# Patient Record
Sex: FEMALE | Race: WHITE | NOT HISPANIC OR LATINO | URBAN - METROPOLITAN AREA
[De-identification: names, ages, dates, MRNs, and addresses within clinical notes are randomized per-mention and may not be internally consistent; named-entity substitution may affect disease eponyms.]

---

## 2017-06-09 ENCOUNTER — DOCUMENTATION ONLY (OUTPATIENT)
Dept: HEMATOLOGY/ONCOLOGY | Facility: CLINIC | Age: 72
End: 2017-06-09

## 2017-06-09 ENCOUNTER — TELEPHONE (OUTPATIENT)
Dept: HEMATOLOGY/ONCOLOGY | Facility: CLINIC | Age: 72
End: 2017-06-09

## 2019-12-03 ENCOUNTER — HOSPITAL ENCOUNTER (OUTPATIENT)
Dept: INFUSION THERAPY | Age: 74
Discharge: HOME | End: 2019-12-03
Payer: MEDICARE

## 2019-12-03 ENCOUNTER — OFFICE VISIT (OUTPATIENT)
Dept: ONCOLOGY | Age: 74
End: 2019-12-03

## 2019-12-03 VITALS
RESPIRATION RATE: 22 BRPM | WEIGHT: 174 LBS | SYSTOLIC BLOOD PRESSURE: 127 MMHG | HEART RATE: 74 BPM | TEMPERATURE: 97.9 F | OXYGEN SATURATION: 98 % | DIASTOLIC BLOOD PRESSURE: 56 MMHG

## 2019-12-03 VITALS
DIASTOLIC BLOOD PRESSURE: 56 MMHG | TEMPERATURE: 97.9 F | RESPIRATION RATE: 22 BRPM | OXYGEN SATURATION: 98 % | SYSTOLIC BLOOD PRESSURE: 127 MMHG | HEART RATE: 74 BPM

## 2019-12-03 DIAGNOSIS — N18.4 STAGE 4 CHRONIC KIDNEY DISEASE (HCC): ICD-10-CM

## 2019-12-03 DIAGNOSIS — D64.9 NORMOCYTIC ANEMIA: Primary | ICD-10-CM

## 2019-12-03 DIAGNOSIS — D64.9 NORMOCYTIC ANEMIA: ICD-10-CM

## 2019-12-03 LAB
ALBUMIN SERPL-MCNC: 3.1 G/DL (ref 3.4–5)
ALBUMIN/GLOB SERPL: 0.9 {RATIO} (ref 0.8–1.7)
ALP SERPL-CCNC: 77 U/L (ref 45–117)
ALT SERPL-CCNC: 19 U/L (ref 13–56)
ANION GAP SERPL CALC-SCNC: 5 MMOL/L (ref 3–18)
AST SERPL-CCNC: 8 U/L (ref 10–38)
BASO+EOS+MONOS # BLD AUTO: 0.7 K/UL (ref 0–2.3)
BASO+EOS+MONOS NFR BLD AUTO: 17 % (ref 0.1–17)
BILIRUB SERPL-MCNC: 0.2 MG/DL (ref 0.2–1)
BUN SERPL-MCNC: 47 MG/DL (ref 7–18)
BUN/CREAT SERPL: 13 (ref 12–20)
CALCIUM SERPL-MCNC: 8.3 MG/DL (ref 8.5–10.1)
CHLORIDE SERPL-SCNC: 117 MMOL/L (ref 100–111)
CO2 SERPL-SCNC: 20 MMOL/L (ref 21–32)
CREAT SERPL-MCNC: 3.55 MG/DL (ref 0.6–1.3)
DIFFERENTIAL METHOD BLD: ABNORMAL
ERYTHROCYTE [DISTWIDTH] IN BLOOD BY AUTOMATED COUNT: 15.3 % (ref 11.5–14.5)
FERRITIN SERPL-MCNC: 26 NG/ML (ref 8–388)
FOLATE SERPL-MCNC: 19.3 NG/ML (ref 3.1–17.5)
GLOBULIN SER CALC-MCNC: 3.5 G/DL (ref 2–4)
GLUCOSE SERPL-MCNC: 118 MG/DL (ref 74–99)
HCT VFR BLD AUTO: 24.7 % (ref 36–48)
HGB BLD-MCNC: 8.2 G/DL (ref 12–16)
IRON SATN MFR SERPL: 17 %
IRON SERPL-MCNC: 47 UG/DL (ref 50–175)
LYMPHOCYTES # BLD: 1.4 K/UL (ref 1.1–5.9)
LYMPHOCYTES NFR BLD: 31 % (ref 14–44)
MCH RBC QN AUTO: 28 PG (ref 25–35)
MCHC RBC AUTO-ENTMCNC: 33.2 G/DL (ref 31–37)
MCV RBC AUTO: 84.3 FL (ref 78–102)
NEUTS SEG # BLD: 2.3 K/UL (ref 1.8–9.5)
NEUTS SEG NFR BLD: 53 % (ref 40–70)
PLATELET # BLD AUTO: 208 K/UL (ref 140–440)
POTASSIUM SERPL-SCNC: 4.9 MMOL/L (ref 3.5–5.5)
PROT SERPL-MCNC: 6.6 G/DL (ref 6.4–8.2)
RBC # BLD AUTO: 2.93 M/UL (ref 4.1–5.1)
RETICS/RBC NFR AUTO: 1.7 % (ref 0.5–2.3)
SODIUM SERPL-SCNC: 142 MMOL/L (ref 136–145)
TIBC SERPL-MCNC: 278 UG/DL (ref 250–450)
VIT B12 SERPL-MCNC: 891 PG/ML (ref 211–911)
WBC # BLD AUTO: 4.4 K/UL (ref 4.5–13)

## 2019-12-03 PROCEDURE — 82607 VITAMIN B-12: CPT

## 2019-12-03 PROCEDURE — 82784 ASSAY IGA/IGD/IGG/IGM EACH: CPT

## 2019-12-03 PROCEDURE — 82728 ASSAY OF FERRITIN: CPT

## 2019-12-03 PROCEDURE — 82668 ASSAY OF ERYTHROPOIETIN: CPT

## 2019-12-03 PROCEDURE — 83540 ASSAY OF IRON: CPT

## 2019-12-03 PROCEDURE — 80053 COMPREHEN METABOLIC PANEL: CPT

## 2019-12-03 PROCEDURE — 85025 COMPLETE CBC W/AUTO DIFF WBC: CPT

## 2019-12-03 PROCEDURE — 36415 COLL VENOUS BLD VENIPUNCTURE: CPT

## 2019-12-03 PROCEDURE — 85045 AUTOMATED RETICULOCYTE COUNT: CPT

## 2019-12-03 RX ORDER — PANTOPRAZOLE SODIUM 40 MG/1
40 TABLET, DELAYED RELEASE ORAL DAILY
COMMUNITY

## 2019-12-03 RX ORDER — LANOLIN ALCOHOL/MO/W.PET/CERES
CREAM (GRAM) TOPICAL 2 TIMES DAILY
COMMUNITY
End: 2021-01-01

## 2019-12-03 RX ORDER — PREGABALIN 150 MG/1
150 CAPSULE ORAL 2 TIMES DAILY
COMMUNITY

## 2019-12-03 RX ORDER — HYDRALAZINE HYDROCHLORIDE 10 MG/1
30 TABLET, FILM COATED ORAL 3 TIMES DAILY
COMMUNITY

## 2019-12-03 RX ORDER — FOLIC ACID 1 MG/1
TABLET ORAL DAILY
COMMUNITY

## 2019-12-03 RX ORDER — IRON 18 MG
TABLET ORAL DAILY
COMMUNITY
End: 2019-12-18

## 2019-12-03 RX ORDER — LANOLIN ALCOHOL/MO/W.PET/CERES
1000 CREAM (GRAM) TOPICAL DAILY
COMMUNITY

## 2019-12-03 RX ORDER — SODIUM BICARBONATE 650 MG/1
650 TABLET ORAL 2 TIMES DAILY
COMMUNITY

## 2019-12-03 RX ORDER — BUDESONIDE AND FORMOTEROL FUMARATE DIHYDRATE 160; 4.5 UG/1; UG/1
2 AEROSOL RESPIRATORY (INHALATION) 2 TIMES DAILY
COMMUNITY

## 2019-12-03 NOTE — PROGRESS NOTES
Cuauhtemoc Oconnell is a 76 y.o. female presenting today for a new patient appointment. Patient is ambulatory with a cane, is accompanied by her sister, and reports dyspnea. Chief Complaint Patient presents with  Anemia Visit Vitals /56 Pulse 74 Temp 97.9 °F (36.6 °C) (Oral) Resp 22 Wt 174 lb (78.9 kg) SpO2 98% Current Outpatient Medications Medication Sig  pantoprazole (PROTONIX) 40 mg tablet Take 40 mg by mouth daily.  cyanocobalamin (VITAMIN B-12) 1,000 mcg tablet Take 1,000 mcg by mouth daily.  folic acid (FOLVITE) 1 mg tablet Take  by mouth daily.  iron 18 mg tab Take  by mouth daily.  sodium bicarbonate 650 mg tablet Take 650 mg by mouth two (2) times a day.  pregabalin (LYRICA) 150 mg capsule Take 150 mg by mouth two (2) times a day.  ferrous sulfate 325 mg (65 mg iron) tablet Take  by mouth two (2) times a day.  budesonide-formoterol (SYMBICORT) 160-4.5 mcg/actuation HFAA Take 2 Puffs by inhalation two (2) times a day.  hydrALAZINE (APRESOLINE) 10 mg tablet Take 30 mg by mouth three (3) times daily.  pitavastatin calcium (LIVALO) 4 mg tab tablet Take  by mouth daily. No current facility-administered medications for this visit. Medications no longer taking/discontinued: none Fall Risk Assessment, last 12 mths 12/3/2019 Able to walk? Yes Fall in past 12 months? No  
 
 
3 most recent PHQ Screens 12/3/2019 Little interest or pleasure in doing things Not at all Feeling down, depressed, irritable, or hopeless Not at all Total Score PHQ 2 0 Abuse Screening Questionnaire 12/3/2019 Do you ever feel afraid of your partner? [place] Are you in a relationship with someone who physically or mentally threatens you? [place] Is it safe for you to go home? Aurea Philip Learning Assessment 12/3/2019 PRIMARY LEARNER Patient PRIMARY LANGUAGE ENGLISH  
LEARNER PREFERENCE PRIMARY LISTENING  
ANSWERED BY patient RELATIONSHIP SELF

## 2019-12-03 NOTE — PROGRESS NOTES
[address] [facility] [address] [facility], [address] [facility] Office Phone: [phone] Fax: [phone] NEW HEME/ONC CONSULT Reason for visit:  Anemia HPI:   Cuauhtemoc Oconnell is a 76 y.o.  female with past medical history including hypertension, CKD 4, type 2 diabetes, hyperlipidemia and COPD, who I was asked to see in consultation at the request of Rica Alanis and Madeleine Frias MD, PA-C for evaluation for anemia. As per note, labs on 10/15/2019 showed H&H 6.7/21.4, platelet 152, transferrin saturation 43%, ferritin 105. MCV 89. BUN 50, creatinine 3.19.  TSH normal, vitamin B12 normal at 972. [place] Past Medical History:  
Diagnosis Date  Anemia  Chronic kidney disease (CKD)  COPD (chronic obstructive pulmonary disease) (HCC)  Diabetes ([address]  GERD (gastroesophageal reflux disease)  HTN (hypertension)  Hyperlipidemia Past Surgical History:  
Procedure Laterality Date 201 [facility] Social History Socioeconomic History  Marital status: UNKNOWN Spouse name: Not on file  Number of children: Not on file  Years of education: Not on file  Highest education level: Not on file Tobacco Use  Smoking status: Former Smoker  Smokeless tobacco: Never Used Substance and Sexual Activity  Alcohol use: Not Currently  Drug use: Never  Sexual activity: Not Currently History reviewed. No pertinent family history. No Known Allergies Review of Systems Denies any fevers, chills, shortness of breath, nausea vomiting abdominal pain. No weight loss. No focal neurologic deficit. Only positive in ROS is dyspnea on exertion occasionally. No melena or BRBPR. All other point of review of system have been reviewed and were negative. ECOG performance status 1. Independent with ADLs and IADLs. Objective: 
Physical Exam: 
Visit Vitals /56 Pulse 74 Temp 97.9 °F (36.6 °C) (Oral) Resp 22 Wt 78.9 kg (174 lb) SpO2 98% General:  Alert, cooperative, no distress, appears stated age. Head:  Normocephalic, without obvious abnormality, atraumatic. Eyes:  Conjunctivae/corneas clear. PERRL, EOMs intact. Throat: Lips, mucosa, and tongue normal.   
Neck: Supple, symmetrical, trachea midline, no adenopathy, thyroid: no enlargement/tenderness/nodules Back:   Symmetric, no curvature. ROM normal. No CVA tenderness. Lungs:   Clear to auscultation bilaterally. Chest wall:  No tenderness or deformity. Heart:  Regular rate and rhythm, S1, S2 normal, no murmur, click, rub or gallop. Abdomen:   Soft, non-tender. Bowel sounds normal. No masses,  No organomegaly. Extremities: Extremities normal, atraumatic, no cyanosis or edema. Skin: Skin color, texture, turgor normal. No rashes or lesions. Lymph nodes: Cervical, supraclavicular, and axillary nodes normal.  
Neurologic: CNII-XII intact. Diagnostic Imaging No results found for this or any previous visit. Lab Results No results found for: WBC, HGB, HGBP, HCT, PHCT, RBCH, PLT, MCV, HGBEXT, HCTEXT, PLTEXT, HGBEXT, HCTEXT, PLTEXT No results found for: NA, K, CL, CO2, AGAP, GLU, BUN, CREA, BUCR, GFRAA, GFRNA, CA, TBIL, GPT, SGOT, AP, TP, ALB, GLOB, AGRAT, ALT Assessment/Plan: 
76 y.o. female 1. Normocytic anemia Patient with normocytic anemia likely from anemia of chronic kidney disease. She had colonoscopy in [place] she said. She was told to have repeated in 5 years she said. I will however r/o myeloma as well as nutritional deficiency as below. - CBC WITH AUTOMATED DIFF; Future - FERRITIN; Future 
- IRON PROFILE; Future - METABOLIC PANEL, COMPREHENSIVE; Future - RETICULOCYTE COUNT; Future - VITAMIN B12 & FOLATE; Future - GAMMOPATHY EVAL, SPEP/KELLI, IG QT/FLC; Future 2. Stage 4 chronic kidney disease ([facility] [address] Check serum Epo level.  If<500, then she would qualify for erythropoietin agent (BOONE) replacement if iron store adequate. Follow-up with nephrologist. 
- CBC WITH AUTOMATED DIFF; Future - FERRITIN; Future 
- IRON PROFILE; Future - METABOLIC PANEL, COMPREHENSIVE; Future - RETICULOCYTE COUNT; Future - VITAMIN B12 & FOLATE; Future - GAMMOPATHY EVAL, SPEP/KELLI, IG QT/FLC; Future - Epo level Return in 2 weeks

## 2019-12-03 NOTE — PROGRESS NOTES
[facility] Progress Note Date: December 3, 2019 Name: Carlos Travis MRN: [id number] : 1945 Peripheral Lab Draw Ms. Espinal to [place], ambulatory at 1400 accompanied by self. Pt was assessed and education was provided. Ms. Gayathri Zafar vitals were reviewed and patient was observed for 5 minutes prior to treatment. Visit Vitals /56 Pulse 74 Temp 97.9 °F (36.6 °C) (Oral) Resp 22 SpO2 98% Recent Results (from the past 12 hour(s)) CBC WITH 3 PART DIFF Collection Time: 19  2:10 PM  
Result Value Ref Range WBC 4.4 (L) 4.5 - 13.0 K/uL  
 RBC 2.93 (L) 4.10 - 5.10 M/uL HGB 8.2 (L) 12.0 - 16.0 g/dL HCT 24.7 (L) 36 - 48 % MCV 84.3 78 - 102 FL  
 MCH 28.0 25.0 - 35.0 PG  
 MCHC 33.2 31 - 37 g/dL  
 RDW 15.3 (H) 11.5 - 14.5 % PLATELET 169 778 - 161 K/uL NEUTROPHILS 53 40 - 70 % MIXED CELLS 17 0.1 - 17 % LYMPHOCYTES 31 14 - 44 % ABS. NEUTROPHILS 2.3 1.8 - 9.5 K/UL  
 ABS. MIXED CELLS 0.7 0.0 - 2.3 K/uL  
 ABS. LYMPHOCYTES 1.4 1.1 - 5.9 K/UL  
 DF AUTOMATED Blood obtained peripherally from left arm x 1 attempt with butterfly needle and sent to lab for Cbc w/diff, Cmp, Ferritin, Iron Profile, Retic count, Vitamin B12 & Folate, Erthropoietin and Gammopathy Eval per written orders. No bleeding or hematoma noted at site. Gauze and coban applied. Ms. Trupti Barragan tolerated the phlebotomy, and had no complaints. Patient armband removed and shredded. Ms. Trupti Barragan was discharged from [place] [address] in stable condition at 1410. Shad Almodovar Phlebotomist PCT December 3, 2019 
2:20 PM

## 2019-12-05 LAB
ALBUMIN SERPL ELPH-MCNC: 3.4 G/DL (ref 2.9–4.4)
ALBUMIN/GLOB SERPL: 1.4 {RATIO} (ref 0.7–1.7)
ALPHA1 GLOB SERPL ELPH-MCNC: 0.2 G/DL (ref 0–0.4)
ALPHA2 GLOB SERPL ELPH-MCNC: 0.6 G/DL (ref 0.4–1)
B-GLOBULIN SERPL ELPH-MCNC: 1 G/DL (ref 0.7–1.3)
EPO SERPL-ACNC: 14.6 MIU/ML (ref 2.6–18.5)
GAMMA GLOB SERPL ELPH-MCNC: 0.7 G/DL (ref 0.4–1.8)
GLOBULIN SER-MCNC: 2.5 G/DL (ref 2.2–3.9)
IGA SERPL-MCNC: 227 MG/DL (ref 64–422)
IGG SERPL-MCNC: 854 MG/DL (ref 700–1600)
IGM SERPL-MCNC: 53 MG/DL (ref 26–217)
INTERPRETATION SERPL IEP-IMP: ABNORMAL
KAPPA LC FREE SER-MCNC: 95 MG/L (ref 3.3–19.4)
KAPPA LC FREE/LAMBDA FREE SER: 1.73 {RATIO} (ref 0.26–1.65)
LAMBDA LC FREE SERPL-MCNC: 54.8 MG/L (ref 5.7–26.3)
M PROTEIN SERPL ELPH-MCNC: ABNORMAL G/DL
PROT SERPL-MCNC: 5.9 G/DL (ref 6–8.5)

## 2019-12-18 ENCOUNTER — OFFICE VISIT (OUTPATIENT)
Dept: ONCOLOGY | Age: 74
End: 2019-12-18

## 2019-12-18 VITALS
DIASTOLIC BLOOD PRESSURE: 62 MMHG | SYSTOLIC BLOOD PRESSURE: 136 MMHG | OXYGEN SATURATION: 98 % | BODY MASS INDEX: 29.76 KG/M2 | RESPIRATION RATE: 18 BRPM | TEMPERATURE: 99.4 F | HEART RATE: 83 BPM | WEIGHT: 173.4 LBS

## 2019-12-18 DIAGNOSIS — D64.9 NORMOCYTIC ANEMIA: Primary | ICD-10-CM

## 2019-12-18 DIAGNOSIS — N18.4 STAGE 4 CHRONIC KIDNEY DISEASE (HCC): ICD-10-CM

## 2019-12-18 RX ORDER — LINAGLIPTIN 5 MG/1
TABLET, FILM COATED ORAL
Refills: 0 | COMMUNITY
Start: 2019-12-09

## 2019-12-18 NOTE — PROGRESS NOTES
Betty Powell is a 76 y.o. female presenting today for a follow-up appointment. Patient is ambulatory with no assistive devices and denies any complaints. Chief Complaint   Patient presents with    Anemia       Visit Vitals  /62   Pulse 83   Temp 99.4 °F (37.4 °C) (Oral)   Resp 18   Wt 173 lb 6.4 oz (78.7 kg)   SpO2 98%   BMI 29.76 kg/m²       Current Outpatient Medications   Medication Sig    TRADJENTA 5 mg tablet     pantoprazole (PROTONIX) 40 mg tablet Take 40 mg by mouth daily.  cyanocobalamin (VITAMIN B-12) 1,000 mcg tablet Take 1,000 mcg by mouth daily.  folic acid (FOLVITE) 1 mg tablet Take  by mouth daily.  sodium bicarbonate 650 mg tablet Take 650 mg by mouth two (2) times a day.  ferrous sulfate 325 mg (65 mg iron) tablet Take  by mouth two (2) times a day.  hydrALAZINE (APRESOLINE) 10 mg tablet Take 30 mg by mouth three (3) times daily.  pitavastatin calcium (LIVALO) 4 mg tab tablet Take  by mouth daily.  pregabalin (LYRICA) 150 mg capsule Take 150 mg by mouth two (2) times a day.  budesonide-formoterol (SYMBICORT) 160-4.5 mcg/actuation HFAA Take 2 Puffs by inhalation two (2) times a day. No current facility-administered medications for this visit. Medications no longer taking/discontinued: lyrica, symbicort    Fall Risk Assessment, last 12 mths 12/3/2019   Able to walk? Yes   Fall in past 12 months? No       3 most recent PHQ Screens 12/3/2019   Little interest or pleasure in doing things Not at all   Feeling down, depressed, irritable, or hopeless Not at all   Total Score PHQ 2 0       Abuse Screening Questionnaire 12/3/2019   Do you ever feel afraid of your partner? N   Are you in a relationship with someone who physically or mentally threatens you? N   Is it safe for you to go home? Y       1. Have you been to the ER, urgent care clinic since your last visit? Hospitalized since your last visit? No    2.  Have you seen or consulted any other health care providers outside of the [address] [place] [address] since your last visit? Include any pap smears or colon screening.  No

## 2019-12-18 NOTE — LETTER
12/18/19 Patient: José Miguel Tim YOB: 1945 Date of Visit: 12/18/2019 Abdoul Figueroa MD 
[address] [address] [address] VIA Facsimile: [phone] Dear Abdoul Figueroa MD, Thank you for referring Ms. José Miguel Tim to [facility] for evaluation. My notes for this consultation are attached. If you have questions, please do not hesitate to call me. I look forward to following your patient along with you.  
 
 
Sincerely, 
 
Betty Oneil MD

## 2019-12-18 NOTE — PROGRESS NOTES
[facility]  [address] [place] [address], [address]  [facility]  Office Phone: [phone]  Fax: [phone]      Reason for visit:  Anemia    HPI:   Dieudonne Brantley is a 76 y.o.  female with past medical history including hypertension, CKD 4, type 2 diabetes, hyperlipidemia and COPD, who I was asked to see in consultation at the request of Shadi Rueda and Lila Alcaraz MD, PA-C for evaluation for anemia. I saw her on 2019 and labs were ordered including myeloma work-up. She is here today for follow-up. She is clinically doing very well. No change in the interim. DX:Anemia      Past Medical History:   Diagnosis Date    Anemia     Chronic kidney disease (CKD)     COPD (chronic obstructive pulmonary disease) (HCC)     Diabetes (HCC)     GERD (gastroesophageal reflux disease)     HTN (hypertension)     Hyperlipidemia      Past Surgical History:   Procedure Laterality Date    HX HYSTERECTOMY       Social History     Socioeconomic History    Marital status: UNKNOWN     Spouse name: Not on file    Number of children: Not on file    Years of education: Not on file    Highest education level: Not on file   Tobacco Use    Smoking status: Former Smoker     Packs/day: 1.00     Years: 50.00     Pack years: 50.00     Types: Cigarettes     Last attempt to quit: 2018     Years since quittin.9    Smokeless tobacco: Never Used   Substance and Sexual Activity    Alcohol use: Not Currently    Drug use: Never    Sexual activity: Not Currently       No Known Allergies    Review of Systems  Denies any fevers, chills, shortness of breath, nausea vomiting abdominal pain. No weight loss. No focal neurologic deficit. Only positive in ROS is dyspnea on exertion occasionally. No melena or BRBPR. All other point of review of system have been reviewed and were negative. ECOG performance status 1. Independent with ADLs and IADLs.     Objective:  Physical Exam:  Visit Vitals  /62   Pulse 83   Temp 99.4 °F (37.4 °C) (Oral)   Resp 18   Wt 78.7 kg (173 lb 6.4 oz)   SpO2 98%   BMI 29.76 kg/m²         General:  Alert, cooperative, no distress, appears stated age. Head:  Normocephalic, without obvious abnormality, atraumatic. Eyes:  Conjunctivae/corneas clear. PERRL, EOMs intact. Throat: Lips, mucosa, and tongue normal.    Neck: Supple, symmetrical, trachea midline, no adenopathy, thyroid: no enlargement/tenderness/nodules   Back:   Symmetric, no curvature. ROM normal. No CVA tenderness. Lungs:   Clear to auscultation bilaterally. Chest wall:  No tenderness or deformity. Heart:  Regular rate and rhythm, S1, S2 normal, +2/6 JARED   Abdomen:   Soft, non-tender. Bowel sounds normal. No masses,  No organomegaly. Extremities: Extremities normal, atraumatic, no cyanosis or edema. Skin: Skin color, texture, turgor normal. No rashes or lesions. Lymph nodes: Cervical, supraclavicular, and axillary nodes normal.   Neurologic: CNII-XII intact. Diagnostic Imaging     Results for orders placed during the hospital encounter of 12/11/19   CT LOW DOSE LUNG CANCER SCREENING    Narrative EXAM: CT LOW DOSE LUNG CANCER SCREENING    INDICATION: Smoking history. . Lung screening. TECHNIQUE: Low-dose CT scan of the chest without IV contrast including coronal  and sagittal images. DICOM format image data is available to non-affiliated external healthcare  facilities or entities on a secure, media free, reciprocally searchable basis  with patient authorization for 12 months following the date of the study. COMPARISON:   No relevant prior studies available. FINDINGS:  Pulmonary arteries: Unremarkable. .  Aorta: No acute findings. No thoracic aortic aneurysm . Lungs: The lungs are clear and well expanded. No infiltrates or nodules or  masses. Lymph nodes: Unremarkable. No enlarged lymph nodes. Esophagus: Normal  Pleural spaces: Unremarkable.  No significant effusion. No pneumothorax. Heart: Unremarkable. No cardiomegaly. No significant pericardial effusion. No  evidence of right ventricular dysfunction. Thyroid: Normal  Chest wall: No abnormal findings. Bones: No fractures identified. Upper abdomen: Normal      Impression IMPRESSION:  Lung rads category 1. No lung nodules. Continue annual screening with low-dose  CT in 12 months. Lab Results  Lab Results   Component Value Date/Time    WBC 4.4 (L) 12/03/2019 02:10 PM    HGB 8.2 (L) 12/03/2019 02:10 PM    HCT 24.7 (L) 12/03/2019 02:10 PM    PLATELET 181 64/48/9668 02:10 PM    MCV 84.3 12/03/2019 02:10 PM       Lab Results   Component Value Date/Time    Sodium 142 12/03/2019 02:11 PM    Potassium 4.9 12/03/2019 02:11 PM    Chloride 117 (H) 12/03/2019 02:11 PM    CO2 20 (L) 12/03/2019 02:11 PM    Anion gap 5 12/03/2019 02:11 PM    Glucose 118 (H) 12/03/2019 02:11 PM    BUN 47 (H) 12/03/2019 02:11 PM    Creatinine 3.55 (H) 12/03/2019 02:11 PM    BUN/Creatinine ratio 13 12/03/2019 02:11 PM    GFR est AA 15 (L) 12/03/2019 02:11 PM    GFR est non-AA 13 (L) 12/03/2019 02:11 PM    Calcium 8.3 (L) 12/03/2019 02:11 PM    AST (SGOT) 8 (L) 12/03/2019 02:11 PM    Alk. phosphatase 77 12/03/2019 02:11 PM    Protein, total 6.6 12/03/2019 02:11 PM    Protein, total 5.9 (L) 12/03/2019 02:11 PM    Albumin 3.1 (L) 12/03/2019 02:11 PM    Globulin 3.5 12/03/2019 02:11 PM    A-G Ratio 0.9 12/03/2019 02:11 PM    ALT (SGPT) 19 12/03/2019 02:11 PM     Follow-up with PCP for health maintenance    Assessment/Plan:  76 y.o. female  1. Normocytic anemia  Patient with normocytic anemia likely from anemia of chronic kidney disease. She had colonoscopy in [place] she said. She was told to have repeated in 5 years she said. I will however r/o myeloma as well as nutritional deficiency as below. Labs on 12/3/2019 showed a ferritin of 26, transferrin saturation 17%, BUN 47, creatinine 3.55, normal B12 and folate.   IgG, IgA and IgM were normal. K 95,L 55, K/L1.73. Total protein 5.9, albumin 3.4, SPEP showed no M spike and KELLI was normal.  Serum erythropoietin was 14.6. WBC 4.4, H&H 8.2/25, platelet 782. MCV 84. Plan is   #1 to replenish his iron store with IV Injectafer x2  #2 patient has serum erythropoietin less than 500. He will likely benefit from erythropoietin stimulating agent once his iron stores are replenished. I will defer to nephrology. #3 myeloma work-up is negative. The elevated light chain are due to her kidney disease. #4 PCP to refer patient to GI to rule out any GI bleeding. 2. Stage 4 chronic kidney disease (HCC)  Check serum Epo level. If<500, then she would qualify for erythropoietin agent (BOONE) replacement if iron store adequate. Follow-up with nephrologist for erythropoietin stimulating agent and management of her kidney disease.     Return in 6 months

## 2019-12-31 RX ORDER — HEPARIN 100 UNIT/ML
300-500 SYRINGE INTRAVENOUS AS NEEDED
Status: CANCELLED
Start: 2020-01-14

## 2019-12-31 RX ORDER — ALBUTEROL SULFATE 0.83 MG/ML
2.5 SOLUTION RESPIRATORY (INHALATION) AS NEEDED
Status: CANCELLED
Start: 2020-01-14

## 2019-12-31 RX ORDER — ACETAMINOPHEN 325 MG/1
650 TABLET ORAL AS NEEDED
Status: CANCELLED
Start: 2020-01-14

## 2019-12-31 RX ORDER — SODIUM CHLORIDE 0.9 % (FLUSH) 0.9 %
10 SYRINGE (ML) INJECTION AS NEEDED
Status: CANCELLED
Start: 2020-01-14

## 2019-12-31 RX ORDER — ONDANSETRON 2 MG/ML
8 INJECTION INTRAMUSCULAR; INTRAVENOUS AS NEEDED
Status: CANCELLED | OUTPATIENT
Start: 2020-01-14

## 2019-12-31 RX ORDER — EPINEPHRINE 1 MG/ML
0.3 INJECTION, SOLUTION, CONCENTRATE INTRAVENOUS AS NEEDED
Status: CANCELLED | OUTPATIENT
Start: 2020-01-14

## 2019-12-31 RX ORDER — DIPHENHYDRAMINE HYDROCHLORIDE 50 MG/ML
50 INJECTION, SOLUTION INTRAMUSCULAR; INTRAVENOUS AS NEEDED
Status: CANCELLED
Start: 2020-01-14

## 2019-12-31 RX ORDER — SODIUM CHLORIDE 9 MG/ML
25 INJECTION, SOLUTION INTRAVENOUS CONTINUOUS
Status: CANCELLED | OUTPATIENT
Start: 2020-01-14

## 2019-12-31 RX ORDER — HYDROCORTISONE SODIUM SUCCINATE 100 MG/2ML
100 INJECTION, POWDER, FOR SOLUTION INTRAMUSCULAR; INTRAVENOUS AS NEEDED
Status: CANCELLED | OUTPATIENT
Start: 2020-01-14

## 2019-12-31 RX ORDER — SODIUM CHLORIDE 9 MG/ML
10 INJECTION INTRAMUSCULAR; INTRAVENOUS; SUBCUTANEOUS AS NEEDED
Status: CANCELLED | OUTPATIENT
Start: 2020-01-14

## 2020-01-01 ENCOUNTER — HOSPITAL ENCOUNTER (OUTPATIENT)
Dept: INFUSION THERAPY | Age: 75
Discharge: HOME | End: 2020-12-28
Payer: MEDICARE

## 2020-01-01 VITALS
DIASTOLIC BLOOD PRESSURE: 59 MMHG | OXYGEN SATURATION: 98 % | HEART RATE: 77 BPM | SYSTOLIC BLOOD PRESSURE: 147 MMHG | TEMPERATURE: 98.4 F

## 2020-01-01 DIAGNOSIS — D64.9 NORMOCYTIC ANEMIA: ICD-10-CM

## 2020-01-01 DIAGNOSIS — N18.4 STAGE 4 CHRONIC KIDNEY DISEASE (HCC): Primary | ICD-10-CM

## 2020-01-01 LAB
ALBUMIN SERPL-MCNC: 3.1 G/DL (ref 3.4–5)
ALBUMIN/GLOB SERPL: 0.9 {RATIO} (ref 0.8–1.7)
ALP SERPL-CCNC: 79 U/L (ref 45–117)
ALT SERPL-CCNC: 12 U/L (ref 13–56)
ANION GAP SERPL CALC-SCNC: 7 MMOL/L (ref 3–18)
AST SERPL-CCNC: 5 U/L (ref 10–38)
BASOPHILS # BLD: 0.1 K/UL (ref 0–0.1)
BASOPHILS NFR BLD: 2 % (ref 0–2)
BILIRUB SERPL-MCNC: 0.3 MG/DL (ref 0.2–1)
BUN SERPL-MCNC: 43 MG/DL (ref 7–18)
BUN/CREAT SERPL: 7 (ref 12–20)
CALCIUM SERPL-MCNC: 8.1 MG/DL (ref 8.5–10.1)
CHLORIDE SERPL-SCNC: 105 MMOL/L (ref 100–111)
CO2 SERPL-SCNC: 28 MMOL/L (ref 21–32)
CREAT SERPL-MCNC: 6.23 MG/DL (ref 0.6–1.3)
DIFFERENTIAL METHOD BLD: ABNORMAL
EOSINOPHIL # BLD: 0.2 K/UL (ref 0–0.4)
EOSINOPHIL NFR BLD: 7 % (ref 0–5)
ERYTHROCYTE [DISTWIDTH] IN BLOOD BY AUTOMATED COUNT: 17 % (ref 11.6–14.5)
FERRITIN SERPL-MCNC: 36 NG/ML (ref 8–388)
GLOBULIN SER CALC-MCNC: 3.6 G/DL (ref 2–4)
GLUCOSE SERPL-MCNC: 223 MG/DL (ref 74–99)
HCT VFR BLD AUTO: 25 % (ref 35–45)
HGB BLD-MCNC: 7.4 G/DL (ref 12–16)
IRON SATN MFR SERPL: 61 % (ref 20–50)
IRON SERPL-MCNC: 177 UG/DL (ref 50–175)
LYMPHOCYTES # BLD: 0.7 K/UL (ref 0.9–3.6)
LYMPHOCYTES NFR BLD: 22 % (ref 21–52)
MCH RBC QN AUTO: 28 PG (ref 24–34)
MCHC RBC AUTO-ENTMCNC: 29.6 G/DL (ref 31–37)
MCV RBC AUTO: 94.7 FL (ref 74–97)
MONOCYTES # BLD: 0.4 K/UL (ref 0.05–1.2)
MONOCYTES NFR BLD: 11 % (ref 3–10)
NEUTS SEG # BLD: 1.9 K/UL (ref 1.8–8)
NEUTS SEG NFR BLD: 58 % (ref 40–73)
PLATELET # BLD AUTO: 201 K/UL (ref 135–420)
PMV BLD AUTO: 11.5 FL (ref 9.2–11.8)
POTASSIUM SERPL-SCNC: 4.2 MMOL/L (ref 3.5–5.5)
PROT SERPL-MCNC: 6.7 G/DL (ref 6.4–8.2)
RBC # BLD AUTO: 2.64 M/UL (ref 4.2–5.3)
SODIUM SERPL-SCNC: 140 MMOL/L (ref 136–145)
TIBC SERPL-MCNC: 290 UG/DL (ref 250–450)
WBC # BLD AUTO: 3.4 K/UL (ref 4.6–13.2)

## 2020-01-01 PROCEDURE — 80053 COMPREHEN METABOLIC PANEL: CPT

## 2020-01-01 PROCEDURE — 82728 ASSAY OF FERRITIN: CPT

## 2020-01-01 PROCEDURE — 83540 ASSAY OF IRON: CPT

## 2020-01-01 PROCEDURE — 85025 COMPLETE CBC W/AUTO DIFF WBC: CPT

## 2020-01-01 PROCEDURE — 36415 COLL VENOUS BLD VENIPUNCTURE: CPT

## 2020-01-07 ENCOUNTER — HOSPITAL ENCOUNTER (OUTPATIENT)
Dept: INFUSION THERAPY | Age: 75
End: 2020-01-07
Payer: MEDICARE

## 2020-01-07 DIAGNOSIS — D64.9 NORMOCYTIC ANEMIA: ICD-10-CM

## 2020-01-14 ENCOUNTER — HOSPITAL ENCOUNTER (OUTPATIENT)
Dept: INFUSION THERAPY | Age: 75
Discharge: HOME | End: 2020-01-14
Payer: MEDICARE

## 2020-01-14 VITALS
DIASTOLIC BLOOD PRESSURE: 73 MMHG | OXYGEN SATURATION: 98 % | HEART RATE: 78 BPM | TEMPERATURE: 98 F | RESPIRATION RATE: 20 BRPM | SYSTOLIC BLOOD PRESSURE: 160 MMHG

## 2020-01-14 DIAGNOSIS — D64.9 NORMOCYTIC ANEMIA: Primary | ICD-10-CM

## 2020-01-14 DIAGNOSIS — D64.9 NORMOCYTIC ANEMIA: ICD-10-CM

## 2020-01-14 PROCEDURE — 74011250636 HC RX REV CODE- 250/636: Performed by: INTERNAL MEDICINE

## 2020-01-14 PROCEDURE — 96365 THER/PROPH/DIAG IV INF INIT: CPT

## 2020-01-14 RX ORDER — SODIUM CHLORIDE 9 MG/ML
25 INJECTION, SOLUTION INTRAVENOUS CONTINUOUS
Status: CANCELLED | OUTPATIENT
Start: 2020-01-21

## 2020-01-14 RX ORDER — ONDANSETRON 2 MG/ML
8 INJECTION INTRAMUSCULAR; INTRAVENOUS AS NEEDED
Status: CANCELLED | OUTPATIENT
Start: 2020-01-21

## 2020-01-14 RX ORDER — HYDROCORTISONE SODIUM SUCCINATE 100 MG/2ML
100 INJECTION, POWDER, FOR SOLUTION INTRAMUSCULAR; INTRAVENOUS AS NEEDED
Status: CANCELLED | OUTPATIENT
Start: 2020-01-21

## 2020-01-14 RX ORDER — SODIUM CHLORIDE 9 MG/ML
10 INJECTION INTRAMUSCULAR; INTRAVENOUS; SUBCUTANEOUS AS NEEDED
Status: CANCELLED | OUTPATIENT
Start: 2020-01-21

## 2020-01-14 RX ORDER — DIPHENHYDRAMINE HYDROCHLORIDE 50 MG/ML
50 INJECTION, SOLUTION INTRAMUSCULAR; INTRAVENOUS AS NEEDED
Status: CANCELLED
Start: 2020-01-21

## 2020-01-14 RX ORDER — ALBUTEROL SULFATE 0.83 MG/ML
2.5 SOLUTION RESPIRATORY (INHALATION) AS NEEDED
Status: CANCELLED
Start: 2020-01-21

## 2020-01-14 RX ORDER — ACETAMINOPHEN 325 MG/1
650 TABLET ORAL AS NEEDED
Status: CANCELLED
Start: 2020-01-21

## 2020-01-14 RX ORDER — EPINEPHRINE 1 MG/ML
0.3 INJECTION, SOLUTION, CONCENTRATE INTRAVENOUS AS NEEDED
Status: CANCELLED | OUTPATIENT
Start: 2020-01-21

## 2020-01-14 RX ORDER — SODIUM CHLORIDE 0.9 % (FLUSH) 0.9 %
10 SYRINGE (ML) INJECTION AS NEEDED
Status: CANCELLED
Start: 2020-01-21

## 2020-01-14 RX ORDER — HEPARIN 100 UNIT/ML
300-500 SYRINGE INTRAVENOUS AS NEEDED
Status: CANCELLED
Start: 2020-01-21

## 2020-01-14 RX ORDER — SODIUM CHLORIDE 9 MG/ML
10 INJECTION INTRAMUSCULAR; INTRAVENOUS; SUBCUTANEOUS AS NEEDED
Status: ACTIVE | OUTPATIENT
Start: 2020-01-14 | End: 2020-01-14

## 2020-01-14 RX ORDER — SODIUM CHLORIDE 9 MG/ML
25 INJECTION, SOLUTION INTRAVENOUS CONTINUOUS
Status: DISPENSED | OUTPATIENT
Start: 2020-01-14 | End: 2020-01-14

## 2020-01-14 RX ADMIN — FERRIC CARBOXYMALTOSE INJECTION 750 MG: 50 INJECTION, SOLUTION INTRAVENOUS at 10:09

## 2020-01-14 RX ADMIN — SODIUM CHLORIDE 25 ML/HR: 9 INJECTION, SOLUTION INTRAVENOUS at 10:09

## 2020-01-21 ENCOUNTER — HOSPITAL ENCOUNTER (OUTPATIENT)
Dept: INFUSION THERAPY | Age: 75
Discharge: HOME | End: 2020-01-21
Payer: MEDICARE

## 2020-01-21 VITALS
DIASTOLIC BLOOD PRESSURE: 81 MMHG | HEART RATE: 93 BPM | OXYGEN SATURATION: 98 % | TEMPERATURE: 97.3 F | SYSTOLIC BLOOD PRESSURE: 190 MMHG | RESPIRATION RATE: 20 BRPM

## 2020-01-21 DIAGNOSIS — D64.9 NORMOCYTIC ANEMIA: Primary | ICD-10-CM

## 2020-01-21 PROCEDURE — 74011250636 HC RX REV CODE- 250/636: Performed by: INTERNAL MEDICINE

## 2020-01-21 PROCEDURE — 96365 THER/PROPH/DIAG IV INF INIT: CPT

## 2020-01-21 RX ORDER — ACETAMINOPHEN 325 MG/1
650 TABLET ORAL AS NEEDED
Status: CANCELLED
Start: 2020-01-21

## 2020-01-21 RX ORDER — ONDANSETRON 2 MG/ML
8 INJECTION INTRAMUSCULAR; INTRAVENOUS AS NEEDED
Status: CANCELLED | OUTPATIENT
Start: 2020-01-21

## 2020-01-21 RX ORDER — DIPHENHYDRAMINE HYDROCHLORIDE 50 MG/ML
50 INJECTION, SOLUTION INTRAMUSCULAR; INTRAVENOUS AS NEEDED
Status: CANCELLED
Start: 2020-01-21

## 2020-01-21 RX ORDER — SODIUM CHLORIDE 9 MG/ML
25 INJECTION, SOLUTION INTRAVENOUS CONTINUOUS
Status: DISCONTINUED | OUTPATIENT
Start: 2020-01-21 | End: 2020-01-22 | Stop reason: HOSPADM

## 2020-01-21 RX ORDER — SODIUM CHLORIDE 9 MG/ML
25 INJECTION, SOLUTION INTRAVENOUS CONTINUOUS
Status: CANCELLED | OUTPATIENT
Start: 2020-01-21

## 2020-01-21 RX ORDER — HYDROCORTISONE SODIUM SUCCINATE 100 MG/2ML
100 INJECTION, POWDER, FOR SOLUTION INTRAMUSCULAR; INTRAVENOUS AS NEEDED
Status: CANCELLED | OUTPATIENT
Start: 2020-01-21

## 2020-01-21 RX ORDER — SODIUM CHLORIDE 9 MG/ML
10 INJECTION INTRAMUSCULAR; INTRAVENOUS; SUBCUTANEOUS AS NEEDED
Status: DISCONTINUED | OUTPATIENT
Start: 2020-01-21 | End: 2020-01-22 | Stop reason: HOSPADM

## 2020-01-21 RX ORDER — SODIUM CHLORIDE 0.9 % (FLUSH) 0.9 %
10 SYRINGE (ML) INJECTION AS NEEDED
Status: CANCELLED
Start: 2020-01-21

## 2020-01-21 RX ORDER — HEPARIN 100 UNIT/ML
300-500 SYRINGE INTRAVENOUS AS NEEDED
Status: CANCELLED
Start: 2020-01-21

## 2020-01-21 RX ORDER — ALBUTEROL SULFATE 0.83 MG/ML
2.5 SOLUTION RESPIRATORY (INHALATION) AS NEEDED
Status: CANCELLED
Start: 2020-01-21

## 2020-01-21 RX ORDER — SODIUM CHLORIDE 9 MG/ML
10 INJECTION INTRAMUSCULAR; INTRAVENOUS; SUBCUTANEOUS AS NEEDED
Status: CANCELLED | OUTPATIENT
Start: 2020-01-21

## 2020-01-21 RX ORDER — EPINEPHRINE 1 MG/ML
0.3 INJECTION, SOLUTION, CONCENTRATE INTRAVENOUS AS NEEDED
Status: CANCELLED | OUTPATIENT
Start: 2020-01-21

## 2020-01-21 RX ADMIN — SODIUM CHLORIDE 25 ML/HR: 9 INJECTION, SOLUTION INTRAVENOUS at 14:33

## 2020-01-21 RX ADMIN — FERRIC CARBOXYMALTOSE INJECTION 750 MG: 50 INJECTION, SOLUTION INTRAVENOUS at 14:33

## 2020-01-21 RX ADMIN — SODIUM CHLORIDE 10 ML: 9 INJECTION INTRAMUSCULAR; INTRAVENOUS; SUBCUTANEOUS at 14:57

## 2020-01-21 NOTE — PROGRESS NOTES
[facility] Progress Note    Date: 2020    Name: Jennifer Saab    MRN: [id number]         : 1945    Injectafer Infusion 2 of 2    Ms. Espinal to [facility], at [phone]. Pt was assessed and education was provided. Ms. Leila Cameron vitals were reviewed and patient was observed for 5 minutes prior to treatment. Visit Vitals  /81 (BP 1 Location: Right arm, BP Patient Position: Sitting)   Pulse 93   Temp 97.3 °F (36.3 °C)   Resp 20   SpO2 98%         24g PIV placed in right antecubital x1 attempt. PIV flushed easily and had brisk blood return. Injectafer 750mg/250mL was initiated @ 750 ml/hr over 20 minutes. VS stable and pt denied complaints of itching, lip/tongue/facial swelling, SOB, CP or other complaints. Ms. Néstor Ruiz tolerated the infusion, and had no complaints. VS remained stable. PIV flushed with NS 10 ml and removed. No bleeding or hematoma noted at site. Guaze and coban applied. Patient armband removed and shredded. Ms. Néstor Ruiz was discharged from [place] [address] in stable condition at 1500. She is to return 2020 at 0800 for next appointment.     Vijay Alcaraz RN  2020  1500

## 2020-06-12 ENCOUNTER — APPOINTMENT (OUTPATIENT)
Dept: INFUSION THERAPY | Age: 75
End: 2020-06-12

## 2020-06-18 ENCOUNTER — HOSPITAL ENCOUNTER (OUTPATIENT)
Dept: MAMMOGRAPHY | Age: 75
Discharge: HOME | End: 2020-06-18
Attending: PHYSICIAN ASSISTANT
Payer: MEDICARE

## 2020-06-18 ENCOUNTER — HOSPITAL ENCOUNTER (OUTPATIENT)
Dept: GENERAL RADIOLOGY | Age: 75
Discharge: HOME | End: 2020-06-18
Attending: PHYSICIAN ASSISTANT
Payer: MEDICARE

## 2020-06-18 DIAGNOSIS — N95.1 MENOPAUSAL STATE: ICD-10-CM

## 2020-06-18 DIAGNOSIS — Z12.31 VISIT FOR SCREENING MAMMOGRAM: ICD-10-CM

## 2020-06-18 PROCEDURE — 77063 BREAST TOMOSYNTHESIS BI: CPT

## 2020-06-18 PROCEDURE — 77080 DXA BONE DENSITY AXIAL: CPT

## 2020-07-06 ENCOUNTER — HOSPITAL ENCOUNTER (OUTPATIENT)
Dept: INFUSION THERAPY | Age: 75
Discharge: HOME | End: 2020-07-06
Payer: MEDICARE

## 2020-07-06 VITALS
TEMPERATURE: 97.1 F | SYSTOLIC BLOOD PRESSURE: 148 MMHG | HEART RATE: 68 BPM | OXYGEN SATURATION: 97 % | RESPIRATION RATE: 18 BRPM | DIASTOLIC BLOOD PRESSURE: 59 MMHG

## 2020-07-06 DIAGNOSIS — D64.9 NORMOCYTIC ANEMIA: ICD-10-CM

## 2020-07-06 LAB
ALBUMIN SERPL-MCNC: 3.1 G/DL (ref 3.4–5)
ALBUMIN/GLOB SERPL: 1.1 {RATIO} (ref 0.8–1.7)
ALP SERPL-CCNC: 71 U/L (ref 45–117)
ALT SERPL-CCNC: 12 U/L (ref 13–56)
ANION GAP SERPL CALC-SCNC: 6 MMOL/L (ref 3–18)
AST SERPL-CCNC: 10 U/L (ref 10–38)
BILIRUB SERPL-MCNC: 0.3 MG/DL (ref 0.2–1)
BUN SERPL-MCNC: 41 MG/DL (ref 7–18)
BUN/CREAT SERPL: 10 (ref 12–20)
CALCIUM SERPL-MCNC: 8.1 MG/DL (ref 8.5–10.1)
CHLORIDE SERPL-SCNC: 106 MMOL/L (ref 100–111)
CO2 SERPL-SCNC: 30 MMOL/L (ref 21–32)
CREAT SERPL-MCNC: 4.28 MG/DL (ref 0.6–1.3)
FERRITIN SERPL-MCNC: 70 NG/ML (ref 8–388)
FOLATE SERPL-MCNC: >20 NG/ML (ref 3.1–17.5)
GLOBULIN SER CALC-MCNC: 2.9 G/DL (ref 2–4)
GLUCOSE SERPL-MCNC: 81 MG/DL (ref 74–99)
IRON SATN MFR SERPL: 16 % (ref 20–50)
IRON SERPL-MCNC: 38 UG/DL (ref 50–175)
POTASSIUM SERPL-SCNC: 4.1 MMOL/L (ref 3.5–5.5)
PROT SERPL-MCNC: 6 G/DL (ref 6.4–8.2)
SODIUM SERPL-SCNC: 142 MMOL/L (ref 136–145)
TIBC SERPL-MCNC: 231 UG/DL (ref 250–450)
TSH SERPL DL<=0.05 MIU/L-ACNC: 0.79 UIU/ML (ref 0.36–3.74)
VIT B12 SERPL-MCNC: 628 PG/ML (ref 211–911)

## 2020-07-06 PROCEDURE — 83540 ASSAY OF IRON: CPT

## 2020-07-06 PROCEDURE — 82728 ASSAY OF FERRITIN: CPT

## 2020-07-06 PROCEDURE — 80053 COMPREHEN METABOLIC PANEL: CPT

## 2020-07-06 PROCEDURE — 82607 VITAMIN B-12: CPT

## 2020-07-06 PROCEDURE — 84443 ASSAY THYROID STIM HORMONE: CPT

## 2020-07-06 PROCEDURE — 36415 COLL VENOUS BLD VENIPUNCTURE: CPT

## 2020-07-06 NOTE — PROGRESS NOTES
[facility] Progress Note Date: 2020 Name: Francois Saenz MRN: [id number] : 1945 Peripheral Lab Draw Ms. Espinal to [facility], [facility] at [facility] accompanied by self. Pt was assessed and education was provided. Ms. Augusta Elam vitals were reviewed and patient was observed for 5 minutes prior to treatment. Visit Vitals /59 (BP 1 Location: Left arm, BP Patient Position: Sitting) Pulse 68 Temp 97.1 °F (36.2 °C) Resp 18 SpO2 97% Recent Results (from the past 12 hour(s)) METABOLIC PANEL, COMPREHENSIVE Collection Time: 20  8:20 AM  
Result Value Ref Range Sodium 142 136 - 145 mmol/L Potassium 4.1 3.5 - 5.5 mmol/L Chloride 106 100 - 111 mmol/L  
 CO2 30 21 - 32 mmol/L Anion gap 6 3.0 - 18 mmol/L Glucose 81 74 - 99 mg/dL BUN 41 (H) 7.0 - 18 MG/DL Creatinine 4.28 (H) 0.6 - 1.3 MG/DL  
 BUN/Creatinine ratio 10 (L) 12 - 20 GFR est AA 12 (L) >60 ml/min/1.73m2 GFR est non-AA 10 (L) >60 ml/min/1.73m2 Calcium 8.1 (L) 8.5 - 10.1 MG/DL Bilirubin, total 0.3 0.2 - 1.0 MG/DL  
 ALT (SGPT) 12 (L) 13 - 56 U/L  
 AST (SGOT) 10 10 - 38 U/L Alk. phosphatase 71 45 - 117 U/L Protein, total 6.0 (L) 6.4 - 8.2 g/dL Albumin 3.1 (L) 3.4 - 5.0 g/dL Globulin 2.9 2.0 - 4.0 g/dL A-G Ratio 1.1 0.8 - 1.7 TSH 3RD GENERATION Collection Time: 20  8:20 AM  
Result Value Ref Range TSH 0.79 0.36 - 3.74 uIU/mL Blood obtained peripherally from RAC x 1 attempt with butterfly needle and sent to lab for CBC, CMP, TSH, Vitamin b 12, Ferritin per written orders. No bleeding or hematoma noted at site. Guaze and coban applied. Ms. Hi Spencer tolerated the phlebotomy, and had no complaints. Patient armband removed and shredded. Ms. Hi Spencer was discharged from [place] [address] in stable condition at [address] [place] [address]. She is to return to [place] for follow up appointment. William Church RN 
2020

## 2020-07-10 ENCOUNTER — VIRTUAL VISIT (OUTPATIENT)
Dept: ONCOLOGY | Age: 75
End: 2020-07-10

## 2020-07-10 DIAGNOSIS — D50.9 IRON DEFICIENCY ANEMIA, UNSPECIFIED IRON DEFICIENCY ANEMIA TYPE: Primary | ICD-10-CM

## 2020-07-10 RX ORDER — LANOLIN ALCOHOL/MO/W.PET/CERES
325 CREAM (GRAM) TOPICAL EVERY OTHER DAY
Qty: 30 TAB | Refills: 3 | Status: SHIPPED | OUTPATIENT
Start: 2020-07-10 | End: 2020-08-09

## 2020-07-10 NOTE — PROGRESS NOTES
Lloyd Guaman is a 76 y.o. female presenting today for a follow-up appointment. Patient is at home; and verified by 2 patient identifiers with verbal permission to start virtual visit. Patient accompanied by sister, Juan Irwin. and denies any generalized pain. Patient has no other complaints at this time. Chief Complaint Patient presents with  Follow-up Coordination of Care: 1. Have you been to the ER, urgent care clinic or hospitalized since your last visit? If yes, when, where and reason for the visit. no 
 
2. Have you seen or consulted any other health care providers outside of the [address] [place] [address] since your last visit? Include any pap smears or colon screening. no 
 
Learning Assessment: 
Learning Assessment 12/3/2019 PRIMARY LEARNER Patient PRIMARY LANGUAGE ENGLISH  
LEARNER PREFERENCE PRIMARY LISTENING  
ANSWERED BY patient RELATIONSHIP SELF Depression Screening: 
3 most recent PHQ Screens 7/10/2020 Little interest or pleasure in doing things Not at all Feeling down, depressed, irritable, or hopeless Not at all Total Score PHQ 2 0 Abuse Screening: 
Abuse Screening Questionnaire 7/10/2020 Do you ever feel afraid of your partner? Hiram Basilio Are you in a relationship with someone who physically or mentally threatens you? Hiram Basilio Is it safe for you to go home? Joan Burr Fall Risk Fall Risk Assessment, last 12 mths 7/10/2020 Able to walk? Yes Fall in past 12 months? No  
 
 
Additional instruction for patient to standby for connection with Dr. Agusto Steward.

## 2020-07-10 NOTE — PROGRESS NOTES
Jeff Tay is a 76 y.o. female who was seen by synchronous (real-time) audio- technology on 7/10/2020. Assessment & Plan:  
Diagnoses and all orders for this visit: 
 
1. Iron deficiency anemia, unspecified iron deficiency anemia type 
-     ferrous sulfate 325 mg (65 mg iron) tablet; Take 1 Tab by mouth every other day for 30 days. The complexity of medical decision making for this visit is moderate Follow-up and Dispositions · Return in about 6 months (around 1/10/2021). 1. Normocytic anemia Patient with normocytic anemia likely from anemia of chronic kidney disease. She had colonoscopy in [place] she said. She was told to have repeated in 5 years she said. I will however r/o myeloma as well as nutritional deficiency as below.  
  
Labs on 12/3/2019 showed a ferritin of 26, transferrin saturation 17%, BUN 47, creatinine 3.55, normal B12 and folate. IgG, IgA and IgM were normal.  K 95,L 55, K/L1.73. Total protein 5.9, albumin 3.4, SPEP showed no M spike and KELLI was normal.  Serum erythropoietin was 14.6. WBC 4.4, H&H 8.2/25, platelet 546. MCV 84. Labs on 7/06/20 showed TSH 0.79, normal B12 and folate, BUN 41, creatinine 4.28, transferrin saturation 16%, ferritin 70. On 6/20/2020, H&H 7.8/24.4, WBC 4.2, MCV 89, platelet 335. Plan is #1 s/p  IV Injectafer x2 completed 01/21/20 #2 patient has serum erythropoietin less than 500. He will likely benefit from erythropoietin stimulating agent once his iron stores are replenished. I will defer to nephrology. #3 myeloma work-up is negative. The elevated light chain are due to her kidney disease. #4 PCP to refer patient to GI to rule out any GI bleeding. #5Give Ferrous sulfate every other day with vit C 
  
2. Stage 4 chronic kidney disease ([address] Serum Epo level. If<500, she would qualify for erythropoietin agent (BOONE) replacement if iron store adequate. Follow-up with nephrologist for erythropoietin stimulating agent and management of her kidney disease. CC:  (nephrology) 
  
Return in 6 months I spent at least 25 minutes on this visit with this established patient. Sen Harris Subjective:  
Mik Klein is a 76 y.o.  female with past medical history including hypertension, CKD 4, type 2 diabetes, hyperlipidemia and COPD, who I was asked to see in consultation at the request of Donn Batista and Gianni Amin MD, PA-C for evaluation for anemia. I saw her on 12/03/2019 and labs were ordered including myeloma work-up. Myeloma work-up was negative. Patient has borderline normal iron stores but still has severe anemia due to anemia of chronic kidney disease. She is clinically doing very well. Prior to Admission medications Medication Sig Start Date End Date Taking? Authorizing Provider  
ferrous sulfate 325 mg (65 mg iron) tablet Take 1 Tab by mouth every other day for 30 days. 7/10/20 8/9/20 Yes Shanelle Brito MD  
TRADJENTA 5 mg tablet  12/9/19   Provider, Historical  
pantoprazole (PROTONIX) 40 mg tablet Take 40 mg by mouth daily. Provider, Historical  
cyanocobalamin (VITAMIN B-12) 1,000 mcg tablet Take 1,000 mcg by mouth daily. Provider, Historical  
folic acid (FOLVITE) 1 mg tablet Take  by mouth daily. Provider, Historical  
sodium bicarbonate 650 mg tablet Take 650 mg by mouth two (2) times a day. Provider, Historical  
pregabalin (LYRICA) 150 mg capsule Take 150 mg by mouth two (2) times a day. Provider, Historical  
ferrous sulfate 325 mg (65 mg iron) tablet Take  by mouth two (2) times a day. Provider, Historical  
budesonide-formoterol (SYMBICORT) 160-4.5 mcg/actuation HFAA Take 2 Puffs by inhalation two (2) times a day. Provider, Historical  
hydrALAZINE (APRESOLINE) 10 mg tablet Take 30 mg by mouth three (3) times daily.     Provider, Historical  
 pitavastatin calcium (LIVALO) 4 mg tab tablet Take  by mouth daily. Provider, Historical  
 
Patient Active Problem List  
Diagnosis Code  Normocytic anemia D64.9  Stage 4 chronic kidney disease ([facility] N18.4 Patient Active Problem List  
 Diagnosis Date Noted  Normocytic anemia 12/03/2019  Stage 4 chronic kidney disease ([facility] 12/03/2019 Current Outpatient Medications Medication Sig Dispense Refill  ferrous sulfate 325 mg (65 mg iron) tablet Take 1 Tab by mouth every other day for 30 days. 30 Tab 3  TRADJENTA 5 mg tablet   0  
 pantoprazole (PROTONIX) 40 mg tablet Take 40 mg by mouth daily.  cyanocobalamin (VITAMIN B-12) 1,000 mcg tablet Take 1,000 mcg by mouth daily.  folic acid (FOLVITE) 1 mg tablet Take  by mouth daily.  sodium bicarbonate 650 mg tablet Take 650 mg by mouth two (2) times a day.  pregabalin (LYRICA) 150 mg capsule Take 150 mg by mouth two (2) times a day.  ferrous sulfate 325 mg (65 mg iron) tablet Take  by mouth two (2) times a day.  budesonide-formoterol (SYMBICORT) 160-4.5 mcg/actuation HFAA Take 2 Puffs by inhalation two (2) times a day.  hydrALAZINE (APRESOLINE) 10 mg tablet Take 30 mg by mouth three (3) times daily.  pitavastatin calcium (LIVALO) 4 mg tab tablet Take  by mouth daily. No Known Allergies Past Medical History:  
Diagnosis Date  Anemia  Chronic kidney disease (CKD)  COPD (chronic obstructive pulmonary disease) (HCC)  Diabetes ([facility]  GERD (gastroesophageal reflux disease)  HTN (hypertension)  Hyperlipidemia  Menopause Past Surgical History:  
Procedure Laterality Date  CHEST SURGERY PROCEDURE UNLISTED Right 2020 [facility]ter [facility]  History reviewed. No pertinent family history. Social History Tobacco Use  Smoking status: Former Smoker Packs/day: 1.00 Years: 50.00 Pack years: 50.00 Types: Cigarettes Last attempt to quit: 2018 Years since quittin.5  Smokeless tobacco: Never Used Substance Use Topics  Alcohol use: Not Currently ROS: as per HPi Objective: No flowsheet data found. Additional exam findings: We discussed the expected course, resolution and complications of the diagnosis(es) in detail. Medication risks, benefits, costs, interactions, and alternatives were discussed as indicated. I advised her to contact the office if her condition worsens, changes or fails to improve as anticipated. She expressed understanding with the diagnosis(es) and plan. Aditya Braga, who was evaluated through a patient-initiated, synchronous (real-time) audio- encounter, and/or her healthcare decision maker, is aware that it is a billable service, with coverage as determined by her insurance carrier. She provided verbal consent to proceed: Yes, and patient identification was verified. It was conducted pursuant to the emergency declaration under the [address] [place] [address], [address] [place] [address] authority and the [employer] and [employer] General Act. A caregiver was present when appropriate. Ability to conduct physical exam was limited. I was at home. The patient was at home.  
 
 
Quan Patel MD

## 2020-12-28 NOTE — PROGRESS NOTES
[facility] Progress Note Date: 2020 Name: Sarah Sanchez MRN: [id number] : 1945 Peripheral Lab Draw Ms. Guillermina Redding to [facility], ambulatory at 5168 accompanied by self. Pt was assessed and education was provided. Ms. Braden Lopez vitals were reviewed and patient was observed for 5 minutes prior to treatment. Visit Vitals BP (!) 147/59 (BP 1 Location: Right arm, BP Patient Position: Sitting) Pulse 77 Temp 98.4 °F (36.9 °C) SpO2 98% Recent Results (from the past 12 hour(s)) METABOLIC PANEL, COMPREHENSIVE Collection Time: 20  8:53 AM  
Result Value Ref Range Sodium 140 136 - 145 mmol/L Potassium 4.2 3.5 - 5.5 mmol/L Chloride 105 100 - 111 mmol/L  
 CO2 28 21 - 32 mmol/L Anion gap 7 3.0 - 18 mmol/L Glucose 223 (H) 74 - 99 mg/dL BUN 43 (H) 7.0 - 18 MG/DL Creatinine 6.23 (H) 0.6 - 1.3 MG/DL  
 BUN/Creatinine ratio 7 (L) 12 - 20 GFR est AA 8 (L) >60 ml/min/1.73m2 GFR est non-AA 7 (L) >60 ml/min/1.73m2 Calcium 8.1 (L) 8.5 - 10.1 MG/DL Bilirubin, total 0.3 0.2 - 1.0 MG/DL  
 ALT (SGPT) 12 (L) 13 - 56 U/L  
 AST (SGOT) 5 (L) 10 - 38 U/L Alk. phosphatase 79 45 - 117 U/L Protein, total 6.7 6.4 - 8.2 g/dL Albumin 3.1 (L) 3.4 - 5.0 g/dL Globulin 3.6 2.0 - 4.0 g/dL A-G Ratio 0.9 0.8 - 1.7    
CBC WITH AUTOMATED DIFF Collection Time: 20  8:53 AM  
Result Value Ref Range WBC 3.4 (L) 4.6 - 13.2 K/uL  
 RBC 2.64 (L) 4.20 - 5.30 M/uL HGB 7.4 (L) 12.0 - 16.0 g/dL HCT 25.0 (L) 35.0 - 45.0 % MCV 94.7 74.0 - 97.0 FL  
 MCH 28.0 24.0 - 34.0 PG  
 MCHC 29.6 (L) 31.0 - 37.0 g/dL  
 RDW 17.0 (H) 11.6 - 14.5 % PLATELET 494 649 - 378 K/uL MPV 11.5 9.2 - 11.8 FL  
 NEUTROPHILS 58 40 - 73 % LYMPHOCYTES 22 21 - 52 % MONOCYTES 11 (H) 3 - 10 % EOSINOPHILS 7 (H) 0 - 5 % BASOPHILS 2 0 - 2 %  
 ABS. NEUTROPHILS 1.9 1.8 - 8.0 K/UL  
 ABS. LYMPHOCYTES 0.7 (L) 0.9 - 3.6 K/UL  
 ABS. MONOCYTES 0.4 0.05 - 1.2 K/UL ABS. EOSINOPHILS 0.2 0.0 - 0.4 K/UL  
 ABS. BASOPHILS 0.1 0.0 - 0.1 K/UL  
 DF AUTOMATED Blood obtained peripherally from left arm x 1 attempt with butterfly needle and sent to lab for Cbc w/diff, Cmp, iron Profile and Ferritin per written orders. No bleeding or hematoma noted at site. Gauze and coban applied. Ms. Rosey Garcia tolerated the phlebotomy, and had no complaints. Patient armband removed and shredded. Ms. Rosey Garcia was discharged from [place] [address] in stable condition at 9045. Carrol South Phlebotomist PCT December 28, 2020 
1:56 PM

## 2021-01-01 ENCOUNTER — HOSPITAL ENCOUNTER (OUTPATIENT)
Dept: INFUSION THERAPY | Age: 76
Discharge: HOME | End: 2021-01-11
Payer: MEDICARE

## 2021-01-01 ENCOUNTER — HOSPITAL ENCOUNTER (OUTPATIENT)
Dept: LAB | Age: 76
Discharge: HOME | End: 2021-04-09

## 2021-01-01 ENCOUNTER — APPOINTMENT (OUTPATIENT)
Dept: INFUSION THERAPY | Age: 76
End: 2021-01-01
Payer: MEDICARE

## 2021-01-01 ENCOUNTER — HOSPITAL ENCOUNTER (OUTPATIENT)
Dept: INFUSION THERAPY | Age: 76
Discharge: HOME | End: 2021-06-21
Payer: MEDICARE

## 2021-01-01 ENCOUNTER — OFFICE VISIT (OUTPATIENT)
Age: 76
End: 2021-01-01
Payer: MEDICARE

## 2021-01-01 ENCOUNTER — HOSPITAL ENCOUNTER (OUTPATIENT)
Dept: INFUSION THERAPY | Age: 76
Discharge: HOME | End: 2021-04-19
Payer: MEDICARE

## 2021-01-01 ENCOUNTER — HOSPITAL ENCOUNTER (OUTPATIENT)
Dept: INFUSION THERAPY | Age: 76
Discharge: HOME | End: 2021-02-15
Payer: MEDICARE

## 2021-01-01 ENCOUNTER — HOSPITAL ENCOUNTER (OUTPATIENT)
Dept: INFUSION THERAPY | Age: 76
Discharge: HOME | End: 2021-09-01
Payer: MEDICARE

## 2021-01-01 ENCOUNTER — APPOINTMENT (OUTPATIENT)
Dept: INFUSION THERAPY | Age: 76
End: 2021-01-01

## 2021-01-01 ENCOUNTER — VIRTUAL VISIT (OUTPATIENT)
Age: 76
End: 2021-01-01
Payer: MEDICARE

## 2021-01-01 ENCOUNTER — HOSPITAL ENCOUNTER (OUTPATIENT)
Dept: INFUSION THERAPY | Age: 76
Discharge: HOME | End: 2021-06-07
Payer: MEDICARE

## 2021-01-01 ENCOUNTER — HOSPITAL ENCOUNTER (OUTPATIENT)
Dept: INFUSION THERAPY | Age: 76
Discharge: HOME | End: 2021-04-14
Payer: MEDICARE

## 2021-01-01 ENCOUNTER — HOSPITAL ENCOUNTER (OUTPATIENT)
Dept: INFUSION THERAPY | Age: 76
Discharge: HOME | End: 2021-07-12
Payer: MEDICARE

## 2021-01-01 ENCOUNTER — HOSPITAL ENCOUNTER (OUTPATIENT)
Dept: INFUSION THERAPY | Age: 76
Discharge: HOME | End: 2021-05-05
Payer: MEDICARE

## 2021-01-01 ENCOUNTER — HOSPITAL ENCOUNTER (OUTPATIENT)
Dept: INFUSION THERAPY | Age: 76
Discharge: HOME | End: 2021-02-17
Payer: MEDICARE

## 2021-01-01 ENCOUNTER — HOSPITAL ENCOUNTER (OUTPATIENT)
Dept: INFUSION THERAPY | Age: 76
Discharge: HOME | End: 2021-07-26
Payer: MEDICARE

## 2021-01-01 ENCOUNTER — HOSPITAL ENCOUNTER (OUTPATIENT)
Dept: INFUSION THERAPY | Age: 76
Discharge: HOME | End: 2021-01-18
Payer: MEDICARE

## 2021-01-01 ENCOUNTER — TELEPHONE (OUTPATIENT)
Age: 76
End: 2021-01-01

## 2021-01-01 ENCOUNTER — HOSPITAL ENCOUNTER (OUTPATIENT)
Dept: INFUSION THERAPY | Age: 76
Discharge: HOME | End: 2021-04-12
Payer: MEDICARE

## 2021-01-01 ENCOUNTER — HOSPITAL ENCOUNTER (OUTPATIENT)
Dept: INFUSION THERAPY | Age: 76
Discharge: HOME | End: 2021-03-01
Payer: MEDICARE

## 2021-01-01 ENCOUNTER — HOSPITAL ENCOUNTER (OUTPATIENT)
Dept: INFUSION THERAPY | Age: 76
End: 2021-01-01

## 2021-01-01 ENCOUNTER — HOSPITAL ENCOUNTER (OUTPATIENT)
Dept: INFUSION THERAPY | Age: 76
Discharge: HOME | End: 2021-05-03
Payer: MEDICARE

## 2021-01-01 ENCOUNTER — HOSPITAL ENCOUNTER (OUTPATIENT)
Dept: WOMENS IMAGING | Age: 76
Discharge: HOME | End: 2021-06-24
Attending: PHYSICIAN ASSISTANT
Payer: MEDICARE

## 2021-01-01 ENCOUNTER — HOSPITAL ENCOUNTER (OUTPATIENT)
Dept: INFUSION THERAPY | Age: 76
Discharge: HOME | End: 2021-05-17
Payer: MEDICARE

## 2021-01-01 VITALS
TEMPERATURE: 98.1 F | RESPIRATION RATE: 20 BRPM | HEART RATE: 73 BPM | OXYGEN SATURATION: 93 % | DIASTOLIC BLOOD PRESSURE: 51 MMHG | SYSTOLIC BLOOD PRESSURE: 123 MMHG

## 2021-01-01 VITALS
SYSTOLIC BLOOD PRESSURE: 126 MMHG | DIASTOLIC BLOOD PRESSURE: 59 MMHG | TEMPERATURE: 98 F | HEART RATE: 75 BPM | HEART RATE: 77 BPM | OXYGEN SATURATION: 97 % | RESPIRATION RATE: 18 BRPM | TEMPERATURE: 98.8 F | OXYGEN SATURATION: 95 % | SYSTOLIC BLOOD PRESSURE: 138 MMHG | DIASTOLIC BLOOD PRESSURE: 56 MMHG

## 2021-01-01 VITALS
HEART RATE: 67 BPM | DIASTOLIC BLOOD PRESSURE: 58 MMHG | OXYGEN SATURATION: 93 % | TEMPERATURE: 97.7 F | SYSTOLIC BLOOD PRESSURE: 119 MMHG | RESPIRATION RATE: 16 BRPM

## 2021-01-01 VITALS
WEIGHT: 182.8 LBS | WEIGHT: 182 LBS | DIASTOLIC BLOOD PRESSURE: 49 MMHG | HEART RATE: 74 BPM | TEMPERATURE: 98.4 F | TEMPERATURE: 98.4 F | OXYGEN SATURATION: 93 % | HEIGHT: 64 IN | SYSTOLIC BLOOD PRESSURE: 95 MMHG | OXYGEN SATURATION: 93 % | BODY MASS INDEX: 31.21 KG/M2 | BODY MASS INDEX: 31.24 KG/M2 | SYSTOLIC BLOOD PRESSURE: 83 MMHG | HEART RATE: 74 BPM | DIASTOLIC BLOOD PRESSURE: 46 MMHG | RESPIRATION RATE: 19 BRPM

## 2021-01-01 VITALS
RESPIRATION RATE: 22 BRPM | SYSTOLIC BLOOD PRESSURE: 138 MMHG | HEART RATE: 77 BPM | DIASTOLIC BLOOD PRESSURE: 61 MMHG | OXYGEN SATURATION: 94 % | TEMPERATURE: 97.6 F

## 2021-01-01 VITALS
SYSTOLIC BLOOD PRESSURE: 114 MMHG | HEART RATE: 68 BPM | TEMPERATURE: 97.2 F | WEIGHT: 187 LBS | DIASTOLIC BLOOD PRESSURE: 50 MMHG | OXYGEN SATURATION: 93 % | BODY MASS INDEX: 31.92 KG/M2 | RESPIRATION RATE: 18 BRPM | HEIGHT: 64 IN

## 2021-01-01 VITALS
HEART RATE: 79 BPM | RESPIRATION RATE: 18 BRPM | TEMPERATURE: 99.2 F | OXYGEN SATURATION: 95 % | DIASTOLIC BLOOD PRESSURE: 60 MMHG | SYSTOLIC BLOOD PRESSURE: 137 MMHG

## 2021-01-01 VITALS
RESPIRATION RATE: 18 BRPM | TEMPERATURE: 97.3 F | DIASTOLIC BLOOD PRESSURE: 54 MMHG | OXYGEN SATURATION: 97 % | SYSTOLIC BLOOD PRESSURE: 96 MMHG | HEART RATE: 67 BPM

## 2021-01-01 VITALS
SYSTOLIC BLOOD PRESSURE: 145 MMHG | HEART RATE: 79 BPM | OXYGEN SATURATION: 95 % | TEMPERATURE: 97.6 F | RESPIRATION RATE: 20 BRPM | BODY MASS INDEX: 32.7 KG/M2 | WEIGHT: 190.5 LBS | DIASTOLIC BLOOD PRESSURE: 63 MMHG

## 2021-01-01 VITALS
DIASTOLIC BLOOD PRESSURE: 55 MMHG | OXYGEN SATURATION: 95 % | TEMPERATURE: 98.7 F | SYSTOLIC BLOOD PRESSURE: 122 MMHG | HEART RATE: 78 BPM | RESPIRATION RATE: 18 BRPM

## 2021-01-01 VITALS
OXYGEN SATURATION: 93 % | HEART RATE: 73 BPM | WEIGHT: 186 LBS | TEMPERATURE: 98 F | BODY MASS INDEX: 31.76 KG/M2 | HEIGHT: 64 IN | SYSTOLIC BLOOD PRESSURE: 134 MMHG | DIASTOLIC BLOOD PRESSURE: 57 MMHG | RESPIRATION RATE: 18 BRPM

## 2021-01-01 VITALS
TEMPERATURE: 97.7 F | HEART RATE: 81 BPM | DIASTOLIC BLOOD PRESSURE: 68 MMHG | OXYGEN SATURATION: 97 % | RESPIRATION RATE: 18 BRPM | SYSTOLIC BLOOD PRESSURE: 148 MMHG

## 2021-01-01 VITALS
HEART RATE: 79 BPM | TEMPERATURE: 98.7 F | RESPIRATION RATE: 20 BRPM | SYSTOLIC BLOOD PRESSURE: 140 MMHG | OXYGEN SATURATION: 96 % | DIASTOLIC BLOOD PRESSURE: 61 MMHG

## 2021-01-01 VITALS
OXYGEN SATURATION: 97 % | SYSTOLIC BLOOD PRESSURE: 122 MMHG | RESPIRATION RATE: 22 BRPM | HEART RATE: 70 BPM | DIASTOLIC BLOOD PRESSURE: 54 MMHG | TEMPERATURE: 97.3 F

## 2021-01-01 VITALS
DIASTOLIC BLOOD PRESSURE: 59 MMHG | OXYGEN SATURATION: 96 % | RESPIRATION RATE: 18 BRPM | TEMPERATURE: 96.4 F | SYSTOLIC BLOOD PRESSURE: 138 MMHG | HEART RATE: 78 BPM

## 2021-01-01 DIAGNOSIS — D64.9 NORMOCYTIC ANEMIA: ICD-10-CM

## 2021-01-01 DIAGNOSIS — D64.9 NORMOCYTIC ANEMIA: Primary | ICD-10-CM

## 2021-01-01 DIAGNOSIS — D50.9 IRON DEFICIENCY ANEMIA, UNSPECIFIED IRON DEFICIENCY ANEMIA TYPE: Primary | ICD-10-CM

## 2021-01-01 DIAGNOSIS — D50.9 IRON DEFICIENCY ANEMIA, UNSPECIFIED IRON DEFICIENCY ANEMIA TYPE: ICD-10-CM

## 2021-01-01 DIAGNOSIS — N18.4 STAGE 4 CHRONIC KIDNEY DISEASE (HCC): ICD-10-CM

## 2021-01-01 DIAGNOSIS — N18.4 STAGE 4 CHRONIC KIDNEY DISEASE (HCC): Primary | ICD-10-CM

## 2021-01-01 DIAGNOSIS — Z12.31 ENCOUNTER FOR SCREENING MAMMOGRAM FOR BREAST CANCER: ICD-10-CM

## 2021-01-01 LAB
ABO + RH BLD: NORMAL
ABO + RH BLD: NORMAL
ALBUMIN SERPL-MCNC: 3.4 G/DL (ref 3.4–5)
ALBUMIN SERPL-MCNC: 3.5 G/DL (ref 3.4–5)
ALBUMIN SERPL-MCNC: 3.7 G/DL (ref 3.7–4.7)
ALBUMIN/GLOB SERPL: 0.9 {RATIO} (ref 0.8–1.7)
ALBUMIN/GLOB SERPL: 0.9 {RATIO} (ref 0.8–1.7)
ALBUMIN/GLOB SERPL: 1.4 {RATIO} (ref 1.2–2.2)
ALP SERPL-CCNC: 76 U/L (ref 45–117)
ALP SERPL-CCNC: 82 U/L (ref 45–117)
ALP SERPL-CCNC: 87 IU/L (ref 39–117)
ALT SERPL-CCNC: 13 U/L (ref 13–56)
ALT SERPL-CCNC: 13 U/L (ref 13–56)
ALT SERPL-CCNC: 9 IU/L (ref 0–32)
ANION GAP SERPL CALC-SCNC: 10 MMOL/L (ref 3–18)
ANION GAP SERPL CALC-SCNC: 6 MMOL/L (ref 3–18)
ANTIGENS PRESENT BLD: NORMAL
ANTIGENS PRESENT RBC DONR: NORMAL
AST SERPL-CCNC: 11 IU/L (ref 0–40)
AST SERPL-CCNC: 6 U/L (ref 10–38)
AST SERPL-CCNC: 9 U/L (ref 10–38)
BASO+EOS+MONOS # BLD AUTO: 0.6 K/UL (ref 0–2.3)
BASO+EOS+MONOS # BLD AUTO: 0.9 K/UL (ref 0–2.3)
BASO+EOS+MONOS NFR BLD AUTO: 17 % (ref 0.1–17)
BASO+EOS+MONOS NFR BLD AUTO: 19 % (ref 0.1–17)
BASOPHILS # BLD AUTO: 0 X10E3/UL (ref 0–0.2)
BASOPHILS # BLD: 0 K/UL (ref 0–0.1)
BASOPHILS # BLD: 0.1 K/UL (ref 0–0.1)
BASOPHILS # BLD: 0.1 K/UL (ref 0–0.1)
BASOPHILS NFR BLD AUTO: 1 %
BASOPHILS NFR BLD: 1 % (ref 0–2)
BASOPHILS NFR BLD: 2 % (ref 0–2)
BASOPHILS NFR BLD: 2 % (ref 0–2)
BILIRUB SERPL-MCNC: 0.2 MG/DL (ref 0–1.2)
BILIRUB SERPL-MCNC: 0.4 MG/DL (ref 0.2–1)
BILIRUB SERPL-MCNC: 0.7 MG/DL (ref 0.2–1)
BLD PROD TYP BPU: NORMAL
BLOOD GROUP ANTIBODIES SERPL: NORMAL
BPU ID: NORMAL
BUN SERPL-MCNC: 38 MG/DL (ref 8–27)
BUN SERPL-MCNC: 48 MG/DL (ref 7–18)
BUN SERPL-MCNC: 68 MG/DL (ref 7–18)
BUN/CREAT SERPL: 10 (ref 12–20)
BUN/CREAT SERPL: 8 (ref 12–28)
BUN/CREAT SERPL: 9 (ref 12–20)
CALCIUM SERPL-MCNC: 8 MG/DL (ref 8.5–10.1)
CALCIUM SERPL-MCNC: 8.2 MG/DL (ref 8.7–10.3)
CALCIUM SERPL-MCNC: 8.3 MG/DL (ref 8.5–10.1)
CHLORIDE SERPL-SCNC: 103 MMOL/L (ref 100–111)
CHLORIDE SERPL-SCNC: 105 MMOL/L (ref 100–111)
CHLORIDE SERPL-SCNC: 91 MMOL/L (ref 96–106)
CO2 SERPL-SCNC: 22 MMOL/L (ref 20–29)
CO2 SERPL-SCNC: 25 MMOL/L (ref 21–32)
CO2 SERPL-SCNC: 30 MMOL/L (ref 21–32)
CREAT SERPL-MCNC: 4.48 MG/DL (ref 0.57–1)
CREAT SERPL-MCNC: 5.58 MG/DL (ref 0.6–1.3)
CREAT SERPL-MCNC: 6.58 MG/DL (ref 0.6–1.3)
CROSSMATCH RESULT,%XM: NORMAL
DIFFERENTIAL METHOD BLD: ABNORMAL
EOSINOPHIL # BLD AUTO: 0.2 X10E3/UL (ref 0–0.4)
EOSINOPHIL # BLD: 0.2 K/UL (ref 0–0.4)
EOSINOPHIL # BLD: 0.3 K/UL (ref 0–0.4)
EOSINOPHIL # BLD: 0.4 K/UL (ref 0–0.4)
EOSINOPHIL NFR BLD AUTO: 6 %
EOSINOPHIL NFR BLD: 11 % (ref 0–5)
EOSINOPHIL NFR BLD: 5 % (ref 0–5)
EOSINOPHIL NFR BLD: 6 % (ref 0–5)
EOSINOPHIL NFR BLD: 7 % (ref 0–5)
EOSINOPHIL NFR BLD: 9 % (ref 0–5)
EPO SERPL-ACNC: 8.9 MIU/ML (ref 2.6–18.5)
ERYTHROCYTE [DISTWIDTH] IN BLOOD BY AUTOMATED COUNT: 16.7 % (ref 11.5–14.5)
ERYTHROCYTE [DISTWIDTH] IN BLOOD BY AUTOMATED COUNT: 16.7 % (ref 11.5–14.5)
ERYTHROCYTE [DISTWIDTH] IN BLOOD BY AUTOMATED COUNT: 16.8 % (ref 11.6–14.5)
ERYTHROCYTE [DISTWIDTH] IN BLOOD BY AUTOMATED COUNT: 16.8 % (ref 11.7–15.4)
ERYTHROCYTE [DISTWIDTH] IN BLOOD BY AUTOMATED COUNT: 16.9 % (ref 11.6–14.5)
ERYTHROCYTE [DISTWIDTH] IN BLOOD BY AUTOMATED COUNT: 17.2 % (ref 11.6–14.5)
ERYTHROCYTE [DISTWIDTH] IN BLOOD BY AUTOMATED COUNT: 17.2 % (ref 11.6–14.5)
ERYTHROCYTE [DISTWIDTH] IN BLOOD BY AUTOMATED COUNT: 17.5 % (ref 11.6–14.5)
ERYTHROCYTE [DISTWIDTH] IN BLOOD BY AUTOMATED COUNT: 17.6 % (ref 11.6–14.5)
ERYTHROCYTE [DISTWIDTH] IN BLOOD BY AUTOMATED COUNT: 17.9 % (ref 11.6–14.5)
FERRITIN SERPL-MCNC: 176 NG/ML (ref 8–388)
FERRITIN SERPL-MCNC: 212 NG/ML (ref 15–150)
FERRITIN SERPL-MCNC: 298 NG/ML (ref 8–388)
GLOBULIN SER CALC-MCNC: 2.7 G/DL (ref 1.5–4.5)
GLOBULIN SER CALC-MCNC: 4 G/DL (ref 2–4)
GLOBULIN SER CALC-MCNC: 4.1 G/DL (ref 2–4)
GLUCOSE SERPL-MCNC: 148 MG/DL (ref 74–99)
GLUCOSE SERPL-MCNC: 591 MG/DL (ref 65–99)
GLUCOSE SERPL-MCNC: 94 MG/DL (ref 74–99)
HCT VFR BLD AUTO: 23 % (ref 34–46.6)
HCT VFR BLD AUTO: 24.1 % (ref 35–45)
HCT VFR BLD AUTO: 24.1 % (ref 35–45)
HCT VFR BLD AUTO: 25.3 % (ref 35–45)
HCT VFR BLD AUTO: 26.1 % (ref 35–45)
HCT VFR BLD AUTO: 29.2 % (ref 36–48)
HCT VFR BLD AUTO: 30.4 % (ref 36–48)
HCT VFR BLD AUTO: 31 % (ref 35–45)
HCT VFR BLD AUTO: 34.5 % (ref 35–45)
HCT VFR BLD AUTO: 36.2 % (ref 35–45)
HGB BLD-MCNC: 10.1 G/DL (ref 12–16)
HGB BLD-MCNC: 10.8 G/DL (ref 12–16)
HGB BLD-MCNC: 11.6 G/DL (ref 12–16)
HGB BLD-MCNC: 7 G/DL (ref 11.1–15.9)
HGB BLD-MCNC: 8 G/DL (ref 12–16)
HGB BLD-MCNC: 8 G/DL (ref 12–16)
HGB BLD-MCNC: 8.1 G/DL (ref 12–16)
HGB BLD-MCNC: 8.5 G/DL (ref 12–16)
HGB BLD-MCNC: 9.4 G/DL (ref 12–16)
HGB BLD-MCNC: 9.7 G/DL (ref 12–16)
HISTORY CHECKED?,CKHIST: NORMAL
HISTORY CHECKED?,CKHIST: NORMAL
IMM GRANULOCYTES # BLD AUTO: 0 X10E3/UL (ref 0–0.1)
IMM GRANULOCYTES NFR BLD AUTO: 1 %
IRON SATN MFR SERPL: 17 % (ref 20–50)
IRON SATN MFR SERPL: 19 % (ref 20–50)
IRON SATN MFR SERPL: 21 % (ref 15–55)
IRON SERPL-MCNC: 44 UG/DL (ref 27–139)
IRON SERPL-MCNC: 44 UG/DL (ref 50–175)
IRON SERPL-MCNC: 46 UG/DL (ref 50–175)
LYMPHOCYTES # BLD AUTO: 0.8 X10E3/UL (ref 0.7–3.1)
LYMPHOCYTES # BLD: 0.7 K/UL (ref 0.9–3.6)
LYMPHOCYTES # BLD: 0.7 K/UL (ref 1.1–5.9)
LYMPHOCYTES # BLD: 0.8 K/UL (ref 0.9–3.6)
LYMPHOCYTES # BLD: 0.9 K/UL (ref 0.9–3.6)
LYMPHOCYTES # BLD: 0.9 K/UL (ref 0.9–3.6)
LYMPHOCYTES # BLD: 0.9 K/UL (ref 1.1–5.9)
LYMPHOCYTES # BLD: 1 K/UL (ref 0.9–3.6)
LYMPHOCYTES NFR BLD AUTO: 21 %
LYMPHOCYTES NFR BLD: 17 % (ref 21–52)
LYMPHOCYTES NFR BLD: 19 % (ref 14–44)
LYMPHOCYTES NFR BLD: 20 % (ref 14–44)
LYMPHOCYTES NFR BLD: 21 % (ref 21–52)
LYMPHOCYTES NFR BLD: 21 % (ref 21–52)
LYMPHOCYTES NFR BLD: 22 % (ref 21–52)
LYMPHOCYTES NFR BLD: 23 % (ref 21–52)
MCH RBC QN AUTO: 27.3 PG (ref 26.6–33)
MCH RBC QN AUTO: 27.6 PG (ref 24–34)
MCH RBC QN AUTO: 27.7 PG (ref 24–34)
MCH RBC QN AUTO: 27.7 PG (ref 25–35)
MCH RBC QN AUTO: 27.8 PG (ref 24–34)
MCH RBC QN AUTO: 27.9 PG (ref 24–34)
MCH RBC QN AUTO: 27.9 PG (ref 24–34)
MCH RBC QN AUTO: 28 PG (ref 25–35)
MCH RBC QN AUTO: 28.8 PG (ref 24–34)
MCH RBC QN AUTO: 29 PG (ref 24–34)
MCHC RBC AUTO-ENTMCNC: 30.4 G/DL (ref 31.5–35.7)
MCHC RBC AUTO-ENTMCNC: 31.3 G/DL (ref 31–37)
MCHC RBC AUTO-ENTMCNC: 31.9 G/DL (ref 31–37)
MCHC RBC AUTO-ENTMCNC: 32 G/DL (ref 31–37)
MCHC RBC AUTO-ENTMCNC: 32 G/DL (ref 31–37)
MCHC RBC AUTO-ENTMCNC: 32.2 G/DL (ref 31–37)
MCHC RBC AUTO-ENTMCNC: 32.6 G/DL (ref 31–37)
MCHC RBC AUTO-ENTMCNC: 32.6 G/DL (ref 31–37)
MCHC RBC AUTO-ENTMCNC: 33.2 G/DL (ref 31–37)
MCHC RBC AUTO-ENTMCNC: 33.2 G/DL (ref 31–37)
MCV RBC AUTO: 83.1 FL (ref 74–97)
MCV RBC AUTO: 84 FL (ref 74–97)
MCV RBC AUTO: 85.3 FL (ref 74–97)
MCV RBC AUTO: 86.1 FL (ref 78–102)
MCV RBC AUTO: 87.2 FL (ref 74–97)
MCV RBC AUTO: 87.6 FL (ref 78–102)
MCV RBC AUTO: 88.3 FL (ref 74–97)
MCV RBC AUTO: 88.5 FL (ref 74–97)
MCV RBC AUTO: 90 FL (ref 79–97)
MCV RBC AUTO: 90.5 FL (ref 74–97)
MONOCYTES # BLD AUTO: 0.4 X10E3/UL (ref 0.1–0.9)
MONOCYTES # BLD: 0.5 K/UL (ref 0.05–1.2)
MONOCYTES # BLD: 0.6 K/UL (ref 0.05–1.2)
MONOCYTES # BLD: 0.7 K/UL (ref 0.05–1.2)
MONOCYTES # BLD: 0.8 K/UL (ref 0.05–1.2)
MONOCYTES NFR BLD AUTO: 11 %
MONOCYTES NFR BLD: 12 % (ref 3–10)
MONOCYTES NFR BLD: 13 % (ref 3–10)
MONOCYTES NFR BLD: 15 % (ref 3–10)
MONOCYTES NFR BLD: 16 % (ref 3–10)
MONOCYTES NFR BLD: 16 % (ref 3–10)
MONOCYTES NFR BLD: 19 % (ref 3–10)
MONOCYTES NFR BLD: 21 % (ref 3–10)
NEUTROPHILS # BLD AUTO: 2.3 X10E3/UL (ref 1.4–7)
NEUTROPHILS NFR BLD AUTO: 60 %
NEUTS SEG # BLD: 1.5 K/UL (ref 1.8–8)
NEUTS SEG # BLD: 1.7 K/UL (ref 1.8–8)
NEUTS SEG # BLD: 2 K/UL (ref 1.8–8)
NEUTS SEG # BLD: 2 K/UL (ref 1.8–8)
NEUTS SEG # BLD: 2.1 K/UL (ref 1.8–8)
NEUTS SEG # BLD: 2.2 K/UL (ref 1.8–9.5)
NEUTS SEG # BLD: 2.3 K/UL (ref 1.8–8)
NEUTS SEG # BLD: 2.3 K/UL (ref 1.8–8)
NEUTS SEG # BLD: 2.8 K/UL (ref 1.8–9.5)
NEUTS SEG NFR BLD: 49 % (ref 40–73)
NEUTS SEG NFR BLD: 50 % (ref 40–73)
NEUTS SEG NFR BLD: 52 % (ref 40–73)
NEUTS SEG NFR BLD: 53 % (ref 40–73)
NEUTS SEG NFR BLD: 55 % (ref 40–73)
NEUTS SEG NFR BLD: 56 % (ref 40–73)
NEUTS SEG NFR BLD: 61 % (ref 40–70)
NEUTS SEG NFR BLD: 62 % (ref 40–73)
NEUTS SEG NFR BLD: 64 % (ref 40–70)
PLATELET # BLD AUTO: 153 K/UL (ref 135–420)
PLATELET # BLD AUTO: 166 K/UL (ref 135–420)
PLATELET # BLD AUTO: 168 K/UL (ref 135–420)
PLATELET # BLD AUTO: 168 K/UL (ref 135–420)
PLATELET # BLD AUTO: 171 X10E3/UL (ref 150–450)
PLATELET # BLD AUTO: 173 K/UL (ref 135–420)
PLATELET # BLD AUTO: 180 K/UL (ref 140–440)
PLATELET # BLD AUTO: 185 K/UL (ref 135–420)
PLATELET # BLD AUTO: 201 K/UL (ref 135–420)
PLATELET # BLD AUTO: 213 K/UL (ref 135–420)
PMV BLD AUTO: 11.4 FL (ref 9.2–11.8)
PMV BLD AUTO: 11.5 FL (ref 9.2–11.8)
PMV BLD AUTO: 11.9 FL (ref 9.2–11.8)
PMV BLD AUTO: 12 FL (ref 9.2–11.8)
PMV BLD AUTO: 12.2 FL (ref 9.2–11.8)
PMV BLD AUTO: 12.3 FL (ref 9.2–11.8)
PMV BLD AUTO: 12.8 FL (ref 9.2–11.8)
POTASSIUM SERPL-SCNC: 3.9 MMOL/L (ref 3.5–5.5)
POTASSIUM SERPL-SCNC: 4 MMOL/L (ref 3.5–5.5)
POTASSIUM SERPL-SCNC: 4.9 MMOL/L (ref 3.5–5.2)
PROT SERPL-MCNC: 6.4 G/DL (ref 6–8.5)
PROT SERPL-MCNC: 7.4 G/DL (ref 6.4–8.2)
PROT SERPL-MCNC: 7.6 G/DL (ref 6.4–8.2)
RBC # BLD AUTO: 2.56 X10E6/UL (ref 3.77–5.28)
RBC # BLD AUTO: 2.87 M/UL (ref 4.2–5.3)
RBC # BLD AUTO: 2.9 M/UL (ref 4.2–5.3)
RBC # BLD AUTO: 2.9 M/UL (ref 4.2–5.3)
RBC # BLD AUTO: 3.06 M/UL (ref 4.2–5.3)
RBC # BLD AUTO: 3.39 M/UL (ref 4.1–5.1)
RBC # BLD AUTO: 3.47 M/UL (ref 4.1–5.1)
RBC # BLD AUTO: 3.51 M/UL (ref 4.2–5.3)
RBC # BLD AUTO: 3.9 M/UL (ref 4.2–5.3)
RBC # BLD AUTO: 4 M/UL (ref 4.2–5.3)
SODIUM SERPL-SCNC: 130 MMOL/L (ref 134–144)
SODIUM SERPL-SCNC: 138 MMOL/L (ref 136–145)
SODIUM SERPL-SCNC: 141 MMOL/L (ref 136–145)
SPECIMEN EXP DATE BLD: NORMAL
SPECIMEN EXP DATE BLD: NORMAL
STATUS OF UNIT,%ST: NORMAL
TIBC SERPL-MCNC: 205 UG/DL (ref 250–450)
TIBC SERPL-MCNC: 236 UG/DL (ref 250–450)
TIBC SERPL-MCNC: 255 UG/DL (ref 250–450)
UIBC SERPL-MCNC: 161 UG/DL (ref 118–369)
UNIT DIVISION, %UDIV: 0
WBC # BLD AUTO: 2.9 K/UL (ref 4.6–13.2)
WBC # BLD AUTO: 3.4 K/UL (ref 4.6–13.2)
WBC # BLD AUTO: 3.5 K/UL (ref 4.5–13)
WBC # BLD AUTO: 3.5 K/UL (ref 4.6–13.2)
WBC # BLD AUTO: 3.8 K/UL (ref 4.6–13.2)
WBC # BLD AUTO: 3.8 X10E3/UL (ref 3.4–10.8)
WBC # BLD AUTO: 4 K/UL (ref 4.6–13.2)
WBC # BLD AUTO: 4.1 K/UL (ref 4.6–13.2)
WBC # BLD AUTO: 4.1 K/UL (ref 4.6–13.2)
WBC # BLD AUTO: 4.6 K/UL (ref 4.5–13)
XX-LABCORP SPECIMEN COL,LCBCF: NORMAL

## 2021-01-01 PROCEDURE — 36415 COLL VENOUS BLD VENIPUNCTURE: CPT

## 2021-01-01 PROCEDURE — 99001 SPECIMEN HANDLING PT-LAB: CPT

## 2021-01-01 PROCEDURE — 74011250637 HC RX REV CODE- 250/637: Performed by: INTERNAL MEDICINE

## 2021-01-01 PROCEDURE — G8427 DOCREV CUR MEDS BY ELIG CLIN: HCPCS | Performed by: INTERNAL MEDICINE

## 2021-01-01 PROCEDURE — 80053 COMPREHEN METABOLIC PANEL: CPT

## 2021-01-01 PROCEDURE — 36430 TRANSFUSION BLD/BLD COMPNT: CPT

## 2021-01-01 PROCEDURE — 74011250636 HC RX REV CODE- 250/636: Performed by: NURSE PRACTITIONER

## 2021-01-01 PROCEDURE — 86921 COMPATIBILITY TEST INCUBATE: CPT

## 2021-01-01 PROCEDURE — G8536 NO DOC ELDER MAL SCRN: HCPCS | Performed by: INTERNAL MEDICINE

## 2021-01-01 PROCEDURE — G8417 CALC BMI ABV UP PARAM F/U: HCPCS | Performed by: INTERNAL MEDICINE

## 2021-01-01 PROCEDURE — 99443 PR PHYS/QHP TELEPHONE EVALUATION 21-30 MIN: CPT | Performed by: INTERNAL MEDICINE

## 2021-01-01 PROCEDURE — 74011250636 HC RX REV CODE- 250/636: Performed by: INTERNAL MEDICINE

## 2021-01-01 PROCEDURE — 96365 THER/PROPH/DIAG IV INF INIT: CPT

## 2021-01-01 PROCEDURE — 83540 ASSAY OF IRON: CPT

## 2021-01-01 PROCEDURE — 77030013169 SET IV BLD ICUM -A

## 2021-01-01 PROCEDURE — G8399 PT W/DXA RESULTS DOCUMENT: HCPCS | Performed by: INTERNAL MEDICINE

## 2021-01-01 PROCEDURE — 86902 BLOOD TYPE ANTIGEN DONOR EA: CPT

## 2021-01-01 PROCEDURE — 85025 COMPLETE CBC W/AUTO DIFF WBC: CPT

## 2021-01-01 PROCEDURE — G8432 DEP SCR NOT DOC, RNG: HCPCS | Performed by: INTERNAL MEDICINE

## 2021-01-01 PROCEDURE — 85049 AUTOMATED PLATELET COUNT: CPT

## 2021-01-01 PROCEDURE — 99442 PR PHYS/QHP TELEPHONE EVALUATION 11-20 MIN: CPT | Performed by: INTERNAL MEDICINE

## 2021-01-01 PROCEDURE — P9016 RBC LEUKOCYTES REDUCED: HCPCS

## 2021-01-01 PROCEDURE — 86901 BLOOD TYPING SEROLOGIC RH(D): CPT

## 2021-01-01 PROCEDURE — 99213 OFFICE O/P EST LOW 20 MIN: CPT | Performed by: INTERNAL MEDICINE

## 2021-01-01 PROCEDURE — 86870 RBC ANTIBODY IDENTIFICATION: CPT

## 2021-01-01 PROCEDURE — 96372 THER/PROPH/DIAG INJ SC/IM: CPT

## 2021-01-01 PROCEDURE — 1101F PT FALLS ASSESS-DOCD LE1/YR: CPT | Performed by: INTERNAL MEDICINE

## 2021-01-01 PROCEDURE — 1090F PRES/ABSN URINE INCON ASSESS: CPT | Performed by: INTERNAL MEDICINE

## 2021-01-01 PROCEDURE — 82728 ASSAY OF FERRITIN: CPT

## 2021-01-01 PROCEDURE — 99214 OFFICE O/P EST MOD 30 MIN: CPT | Performed by: INTERNAL MEDICINE

## 2021-01-01 PROCEDURE — 77063 BREAST TOMOSYNTHESIS BI: CPT

## 2021-01-01 PROCEDURE — 86920 COMPATIBILITY TEST SPIN: CPT

## 2021-01-01 PROCEDURE — 82668 ASSAY OF ERYTHROPOIETIN: CPT

## 2021-01-01 PROCEDURE — 86905 BLOOD TYPING RBC ANTIGENS: CPT

## 2021-01-01 PROCEDURE — 86922 COMPATIBILITY TEST ANTIGLOB: CPT

## 2021-01-01 RX ORDER — HEPARIN 100 UNIT/ML
300-500 SYRINGE INTRAVENOUS AS NEEDED
Status: CANCELLED
Start: 2021-01-01

## 2021-01-01 RX ORDER — DIPHENHYDRAMINE HYDROCHLORIDE 50 MG/ML
25 INJECTION, SOLUTION INTRAMUSCULAR; INTRAVENOUS AS NEEDED
Status: CANCELLED
Start: 2021-01-01

## 2021-01-01 RX ORDER — ALBUTEROL SULFATE 0.83 MG/ML
2.5 SOLUTION RESPIRATORY (INHALATION) AS NEEDED
Status: CANCELLED
Start: 2021-01-01

## 2021-01-01 RX ORDER — EPINEPHRINE 1 MG/ML
0.3 INJECTION, SOLUTION, CONCENTRATE INTRAVENOUS AS NEEDED
Status: CANCELLED | OUTPATIENT
Start: 2021-01-01

## 2021-01-01 RX ORDER — SODIUM CHLORIDE 0.9 % (FLUSH) 0.9 %
10-40 SYRINGE (ML) INJECTION AS NEEDED
Status: DISCONTINUED | OUTPATIENT
Start: 2021-01-01 | End: 2021-01-01 | Stop reason: HOSPADM

## 2021-01-01 RX ORDER — HYDROCORTISONE SODIUM SUCCINATE 100 MG/2ML
100 INJECTION, POWDER, FOR SOLUTION INTRAMUSCULAR; INTRAVENOUS AS NEEDED
Status: CANCELLED | OUTPATIENT
Start: 2021-01-01

## 2021-01-01 RX ORDER — SODIUM CHLORIDE 0.9 % (FLUSH) 0.9 %
10 SYRINGE (ML) INJECTION AS NEEDED
Status: CANCELLED | OUTPATIENT
Start: 2021-01-01

## 2021-01-01 RX ORDER — ACETAMINOPHEN 325 MG/1
650 TABLET ORAL AS NEEDED
Status: CANCELLED
Start: 2021-01-01

## 2021-01-01 RX ORDER — ONDANSETRON 2 MG/ML
8 INJECTION INTRAMUSCULAR; INTRAVENOUS AS NEEDED
Status: CANCELLED | OUTPATIENT
Start: 2021-01-01

## 2021-01-01 RX ORDER — DIPHENHYDRAMINE HYDROCHLORIDE 50 MG/ML
50 INJECTION, SOLUTION INTRAMUSCULAR; INTRAVENOUS AS NEEDED
Status: CANCELLED
Start: 2021-01-01

## 2021-01-01 RX ORDER — CALCIUM ACETATE 667 MG/1
CAPSULE ORAL
COMMUNITY
Start: 2021-01-01

## 2021-01-01 RX ORDER — DIPHENHYDRAMINE HCL 25 MG
25 CAPSULE ORAL ONCE
Status: COMPLETED | OUTPATIENT
Start: 2021-01-01 | End: 2021-01-01

## 2021-01-01 RX ORDER — SODIUM CHLORIDE 9 MG/ML
10 INJECTION INTRAMUSCULAR; INTRAVENOUS; SUBCUTANEOUS AS NEEDED
Status: CANCELLED | OUTPATIENT
Start: 2021-01-01

## 2021-01-01 RX ORDER — SODIUM CHLORIDE 9 MG/ML
25 INJECTION, SOLUTION INTRAVENOUS CONTINUOUS
Status: CANCELLED | OUTPATIENT
Start: 2021-01-01

## 2021-01-01 RX ORDER — SODIUM CHLORIDE 9 MG/ML
250 INJECTION, SOLUTION INTRAVENOUS CONTINUOUS
Status: DISCONTINUED | OUTPATIENT
Start: 2021-01-01 | End: 2021-01-01 | Stop reason: HOSPADM

## 2021-01-01 RX ORDER — HEPARIN 100 UNIT/ML
300-500 SYRINGE INTRAVENOUS AS NEEDED
Status: DISCONTINUED | OUTPATIENT
Start: 2021-01-01 | End: 2021-01-01 | Stop reason: HOSPADM

## 2021-01-01 RX ORDER — LANOLIN ALCOHOL/MO/W.PET/CERES
325 CREAM (GRAM) TOPICAL DAILY
Qty: 30 TABLET | Refills: 4 | Status: SHIPPED | OUTPATIENT
Start: 2021-01-01 | End: 2021-01-01

## 2021-01-01 RX ORDER — SODIUM CHLORIDE 9 MG/ML
250 INJECTION, SOLUTION INTRAVENOUS AS NEEDED
Status: DISCONTINUED | OUTPATIENT
Start: 2021-01-01 | End: 2021-01-01 | Stop reason: HOSPADM

## 2021-01-01 RX ORDER — ACETAMINOPHEN 325 MG/1
650 TABLET ORAL ONCE
Status: COMPLETED | OUTPATIENT
Start: 2021-01-01 | End: 2021-01-01

## 2021-01-01 RX ORDER — SODIUM CHLORIDE 0.9 % (FLUSH) 0.9 %
10 SYRINGE (ML) INJECTION AS NEEDED
Status: DISCONTINUED | OUTPATIENT
Start: 2021-01-01 | End: 2021-01-01 | Stop reason: HOSPADM

## 2021-01-01 RX ORDER — SODIUM CHLORIDE 9 MG/ML
10 INJECTION INTRAMUSCULAR; INTRAVENOUS; SUBCUTANEOUS AS NEEDED
Status: DISCONTINUED | OUTPATIENT
Start: 2021-01-01 | End: 2021-01-01 | Stop reason: HOSPADM

## 2021-01-01 RX ORDER — SODIUM CHLORIDE 9 MG/ML
25 INJECTION, SOLUTION INTRAVENOUS CONTINUOUS
Status: DISCONTINUED | OUTPATIENT
Start: 2021-01-01 | End: 2021-01-01 | Stop reason: HOSPADM

## 2021-01-01 RX ADMIN — EPOETIN ALFA-EPBX 40000 UNITS: 40000 INJECTION, SOLUTION INTRAVENOUS; SUBCUTANEOUS at 11:35

## 2021-01-01 RX ADMIN — EPOETIN ALFA-EPBX 20000 UNITS: 40000 INJECTION, SOLUTION INTRAVENOUS; SUBCUTANEOUS at 13:42

## 2021-01-01 RX ADMIN — SODIUM CHLORIDE 25 ML/HR: 9 INJECTION, SOLUTION INTRAVENOUS at 14:51

## 2021-01-01 RX ADMIN — SODIUM CHLORIDE 250 ML: 9 INJECTION, SOLUTION INTRAVENOUS at 08:52

## 2021-01-01 RX ADMIN — EPOETIN ALFA-EPBX 40000 UNITS: 40000 INJECTION, SOLUTION INTRAVENOUS; SUBCUTANEOUS at 11:23

## 2021-01-01 RX ADMIN — SODIUM CHLORIDE 250 ML: 0.9 INJECTION, SOLUTION INTRAVENOUS at 09:33

## 2021-01-01 RX ADMIN — DIPHENHYDRAMINE HYDROCHLORIDE 25 MG: 25 CAPSULE ORAL at 08:51

## 2021-01-01 RX ADMIN — Medication 10 ML: at 08:50

## 2021-01-01 RX ADMIN — DIPHENHYDRAMINE HYDROCHLORIDE 25 MG: 25 CAPSULE ORAL at 09:34

## 2021-01-01 RX ADMIN — SODIUM CHLORIDE 250 ML: 0.9 INJECTION, SOLUTION INTRAVENOUS at 08:42

## 2021-01-01 RX ADMIN — FERRIC CARBOXYMALTOSE INJECTION 750 MG: 50 INJECTION, SOLUTION INTRAVENOUS at 08:52

## 2021-01-01 RX ADMIN — EPOETIN ALFA-EPBX 20000 UNITS: 40000 INJECTION, SOLUTION INTRAVENOUS; SUBCUTANEOUS at 13:46

## 2021-01-01 RX ADMIN — EPOETIN ALFA-EPBX 40000 UNITS: 40000 INJECTION, SOLUTION INTRAVENOUS; SUBCUTANEOUS at 11:37

## 2021-01-01 RX ADMIN — FERRIC CARBOXYMALTOSE INJECTION 750 MG: 50 INJECTION, SOLUTION INTRAVENOUS at 14:51

## 2021-01-01 RX ADMIN — SODIUM CHLORIDE 250 ML: 0.9 INJECTION, SOLUTION INTRAVENOUS at 12:33

## 2021-01-01 RX ADMIN — EPOETIN ALFA-EPBX 40000 UNITS: 40000 INJECTION, SOLUTION INTRAVENOUS; SUBCUTANEOUS at 14:01

## 2021-01-01 RX ADMIN — ACETAMINOPHEN 650 MG: 325 TABLET ORAL at 09:34

## 2021-01-01 RX ADMIN — Medication 10 ML: at 15:22

## 2021-01-01 RX ADMIN — ACETAMINOPHEN 650 MG: 325 TABLET ORAL at 08:51

## 2021-01-01 RX ADMIN — Medication 10 ML: at 08:34

## 2021-01-06 NOTE — PROGRESS NOTES
Emily Gee is a 76 y.o. female who was seen by synchronous (real-time) audio- technology on 1/6/2021. Assessment & Plan:  
Diagnoses and all orders for this visit: 1. Normocytic anemia 2. Stage 4 chronic kidney disease ([facility] The complexity of medical decision making for this visit is moderate Follow-up and Dispositions · Return in about 6 weeks (around 2/17/2021). Routing History 1. Normocytic anemia Patient with normocytic anemia likely from anemia of chronic kidney disease. She had colonoscopy in [place] she said. She was told to have repeated in 5 years she said. I will however r/o myeloma as well as nutritional deficiency as below.  
  
Labs on 12/3/2019 showed a ferritin of 26, transferrin saturation 17%, BUN 47, creatinine 3.55, normal B12 and folate. IgG, IgA and IgM were normal.  K 95,L 55, K/L1.73. Total protein 5.9, albumin 3.4, SPEP showed no M spike and KELLI was normal.  Serum erythropoietin was 14.6. WBC 4.4, H&H 8.2/25, platelet 659. MCV 84. Labs on 7/06/20 showed TSH 0.79, normal B12 and folate, BUN 41, creatinine 4.28, transferrin saturation 16%, ferritin 70. On 6/20/2020, H&H 7.8/24.4, WBC 4.2, MCV 89, platelet 888. She is status post IV iron Injectafer x2 completed on 1/21/2020. Give Injectaferx2 Labs on 12/28/2020 showed BUN 43, creatinine 6.23. Transferrin saturation 61%, ferritin 36, WBC 3.4, H&H 7.4/25.0, MCV 94.7, platelet 722. Patient has serum erythropoietin was  less than 500. She will likely benefit from erythropoietin stimulating agent once his iron stores are replenished. I will defer to nephrology. Myeloma work-up is negative. The elevated light chain are due to her kidney disease. PCP to refer patient to GI to rule out any GI bleeding. Give Ferrous sulfate every other day with vit C 
  
2. Stage 4 chronic kidney disease ([facility] Serum Epo level.  If<500, she would qualify for erythropoietin agent (BOONE) replacement if iron store adequate. Follow-up with nephrologist for erythropoietin stimulating agent and management of her kidney disease. CC:  (nephrology) 
  
Return in 6 months I spent at least 25 minutes on this visit with this established patient. Sen Harris Subjective:  
Kerri Cummins is a 76 y.o.  female with past medical history including hypertension, CKD 4, type 2 diabetes, hyperlipidemia and COPD, who I was asked to see in consultation at the request of Tabitha Paul and Jesse Ibarra MD, PA-C for evaluation for anemia. I saw her on 12/03/2019 and labs were ordered including myeloma work-up. Myeloma work-up was negative. Patient has borderline normal iron stores but still has severe anemia due to anemia of chronic kidney disease. She is clinically doing very well. Prior to Admission medications Medication Sig Start Date End Date Taking? Authorizing Provider TRADJENTA 5 mg tablet  12/9/19  Yes Provider, Historical  
pantoprazole (PROTONIX) 40 mg tablet Take 40 mg by mouth daily. Yes Provider, Historical  
cyanocobalamin (VITAMIN B-12) 1,000 mcg tablet Take 1,000 mcg by mouth daily. Yes Provider, Historical  
folic acid (FOLVITE) 1 mg tablet Take  by mouth daily. Yes Provider, Historical  
sodium bicarbonate 650 mg tablet Take 650 mg by mouth two (2) times a day. Yes Provider, Historical  
pregabalin (LYRICA) 150 mg capsule Take 150 mg by mouth two (2) times a day. Yes Provider, Historical  
ferrous sulfate 325 mg (65 mg iron) tablet Take  by mouth two (2) times a day. Yes Provider, Historical  
budesonide-formoterol (SYMBICORT) 160-4.5 mcg/actuation HFAA Take 2 Puffs by inhalation two (2) times a day. Yes Provider, Historical  
hydrALAZINE (APRESOLINE) 10 mg tablet Take 30 mg by mouth three (3) times daily. Yes Provider, Historical  
pitavastatin calcium (LIVALO) 4 mg tab tablet Take  by mouth daily. Yes Provider, Historical  
 
Patient Active Problem List  
Diagnosis Code  Normocytic anemia D64.9  Stage 4 chronic kidney disease ([facility] N18.4 Patient Active Problem List  
 Diagnosis Date Noted  Normocytic anemia 12/03/2019  Stage 4 chronic kidney disease ([facility] 12/03/2019 Current Outpatient Medications Medication Sig Dispense Refill  TRADJENTA 5 mg tablet   0  
 pantoprazole (PROTONIX) 40 mg tablet Take 40 mg by mouth daily.  cyanocobalamin (VITAMIN B-12) 1,000 mcg tablet Take 1,000 mcg by mouth daily.  folic acid (FOLVITE) 1 mg tablet Take  by mouth daily.  sodium bicarbonate 650 mg tablet Take 650 mg by mouth two (2) times a day.  pregabalin (LYRICA) 150 mg capsule Take 150 mg by mouth two (2) times a day.  ferrous sulfate 325 mg (65 mg iron) tablet Take  by mouth two (2) times a day.  budesonide-formoterol (SYMBICORT) 160-4.5 mcg/actuation HFAA Take 2 Puffs by inhalation two (2) times a day.  hydrALAZINE (APRESOLINE) 10 mg tablet Take 30 mg by mouth three (3) times daily.  pitavastatin calcium (LIVALO) 4 mg tab tablet Take  by mouth daily. No Known Allergies Past Medical History:  
Diagnosis Date  Anemia  Chronic kidney disease (CKD)  COPD (chronic obstructive pulmonary disease) (HCC)  Diabetes ([facility]  GERD (gastroesophageal reflux disease)  HTN (hypertension)  Hyperlipidemia  Menopause Past Surgical History:  
Procedure Laterality Date [address] CHEST SURGERY PROCEDURE UNLISTED Right 2020 Dialysis Catheter History reviewed. No pertinent family history. Social History Tobacco Use  Smoking status: Former Smoker Packs/day: 1.00 Years: 50.00 Pack years: 50.00 Types: Cigarettes Quit date: 2018 Years since quitting: 3.0  Smokeless tobacco: Never Used Substance Use Topics  Alcohol use: Not Currently ROS: as per HPi Objective: No flowsheet data found. Additional exam findings:   
 
 
We discussed the expected course, resolution and complications of the diagnosis(es) in detail. Medication risks, benefits, costs, interactions, and alternatives were discussed as indicated. I advised her to contact the office if her condition worsens, changes or fails to improve as anticipated. She expressed understanding with the diagnosis(es) and plan. Rox Jeff, who was evaluated through a patient-initiated, synchronous (real-time) audio- encounter, and/or her healthcare decision maker, is aware that it is a billable service, with coverage as determined by her insurance carrier. She provided verbal consent to proceed: Yes, and patient identification was verified. It was conducted pursuant to the emergency declaration under the [facility], [address] [place] [address] authority and the [employer] General Act. A caregiver was present when appropriate. Ability to conduct physical exam was limited. I was at home. The patient was at home.  
 
 
Harrison Esparza MD

## 2021-01-06 NOTE — PROGRESS NOTES
Jazmyne Desai is a 76 y.o. female presenting today for a follow-up appointment via Telehealth. Identified patient using two identifiers (full name and ). Patient denies any complaints. Provider was advised. Chief Complaint Patient presents with  Anemia Current Outpatient Medications Medication Sig  TRADJENTA 5 mg tablet  pantoprazole (PROTONIX) 40 mg tablet Take 40 mg by mouth daily.  cyanocobalamin (VITAMIN B-12) 1,000 mcg tablet Take 1,000 mcg by mouth daily.  folic acid (FOLVITE) 1 mg tablet Take  by mouth daily.  sodium bicarbonate 650 mg tablet Take 650 mg by mouth two (2) times a day.  pregabalin (LYRICA) 150 mg capsule Take 150 mg by mouth two (2) times a day.  ferrous sulfate 325 mg (65 mg iron) tablet Take  by mouth two (2) times a day.  budesonide-formoterol (SYMBICORT) 160-4.5 mcg/actuation HFAA Take 2 Puffs by inhalation two (2) times a day.  hydrALAZINE (APRESOLINE) 10 mg tablet Take 30 mg by mouth three (3) times daily.  pitavastatin calcium (LIVALO) 4 mg tab tablet Take  by mouth daily. No current facility-administered medications for this visit. Fall Risk Assessment, last 12 mths 7/10/2020 Able to walk? Yes Fall in past 12 months? No  
 
 
3 most recent PHQ Screens 7/10/2020 Little interest or pleasure in doing things Not at all Feeling down, depressed, irritable, or hopeless Not at all Total Score PHQ 2 0 Abuse Screening Questionnaire 7/10/2020 Do you ever feel afraid of your partner? Chinyere Talbot Are you in a relationship with someone who physically or mentally threatens you? Chinyere Talbot Is it safe for you to go home? Y  
 
 
1. Have you been to the ER, urgent care clinic since your last visit? Hospitalized since your last visit? Yes 2020 [facility] - Blood Transfusion 2. Have you seen or consulted any other health care providers outside of the [address] [place] [address] since your last visit? Include any pap smears or colon screening.  No

## 2021-01-11 NOTE — PROGRESS NOTES
[facility] Progress Note Date: 2021 Name: Jazmyne Desai MRN: [id number] : 1945 Injectafer Infusion 1 of 2 Ms. Espinal to [facility], ambulatory, at 0830. Pt was assessed and education was provided. Injectafer care notes read to patient and copy sent home with patient, all questions answered. Ms. Alva Paula vitals were reviewed and patient was observed for 5 minutes prior to treatment. Visit Vitals BP (!) 114/50 (BP 1 Location: Right arm, BP Patient Position: At rest) Pulse 68 Temp 97.2 °F (36.2 °C) Resp 18 Ht 5' 4\" (1.626 m) Wt 84.8 kg (187 lb) SpO2 93% BMI 32.10 kg/m²  
 
 
 
22g PIV placed in right hand x1 attempt. PIV flushed easily and had brisk blood return. Injectafer 750mg/250mL was initiated @ 750 ml/hr over 20 minutes. VS stable and pt denied complaints of itching, lip/tongue/facial swelling, SOB, CP or other complaints. Upon completion Ms. Kaleta Osgood was observed for 30 minutes. Ms. Kaleta Osgood tolerated the infusion, and had no complaints. VS remained stable. PIV flushed with NS 10 ml and removed. No bleeding or hematoma noted at site. Guaze and coban applied. Patient armband removed and shredded. Ms. Kaleta Osgood was discharged from [place] [address] in stable condition at 1500. She is to return 2021 at 1400 for next appointment. Fallon Lopez RN 
2021

## 2021-01-15 PROBLEM — D50.9 IRON DEFICIENCY ANEMIA, UNSPECIFIED: Status: ACTIVE | Noted: 2021-01-01

## 2021-01-18 NOTE — PROGRESS NOTES
[facility] Progress Note Date: 2021 Name: Yuriy Hilton MRN: [id number] : 1945 Injectafer Infusion 1 of 2 Ms. Espinal to [address], ambulatory, at [phone]. Pt was assessed and education was provided. Injectafer care notes read to patient and copy sent home with patient, all questions answered. Ms. Cesar Campa vitals were reviewed and patient was observed for 5 minutes prior to treatment. Visit Vitals BP (!) 122/55 (BP 1 Location: Right arm, BP Patient Position: At rest) Pulse 78 Temp 98.7 °F (37.1 °C) Resp 18 SpO2 95% 22g PIV placed in right arm x1 attempt. PIV flushed easily and had brisk blood return. Injectafer 750mg/250mL was initiated @ 750 ml/hr over 20 minutes. VS stable and pt denied complaints of itching, lip/tongue/facial swelling, SOB, CP or other complaints. Upon completion Ms. Nicky Guevara was observed for 30 minutes. Ms. Nicky Guevara tolerated the infusion, and had no complaints. VS remained stable. PIV flushed with NS 10 ml and removed. No bleeding or hematoma noted at site. Guaze and coban applied. Patient armband removed and shredded. Ms. Nicky Guevara was discharged from [place] [address] in stable condition at 1520. She is to return 2021  for next appointment. Armando Cabrera RN 
2021

## 2021-02-15 NOTE — PROGRESS NOTES
[facility] Progress Note Date: February 15, 2021 Name: Glendy Jones MRN: [id number] : 1945 POC Labs Epoetin alpha Injection(Retacrit) (HELD) Ms. Toya Neal arrived to [facility], ambulatory, and in no acute distress,  at 1115. Pt was assessed and education was provided. Patient denied complaints of pain or discomfort. Patient presents today for first dose of Epoetin alpha inj., dependent upon today's lab results, and as ordered. Procedure was explained to patient and she verbalized her understanding. Patient denied any questions or concerns regarding therapy plan. Ms. Jose Munguia vitals were reviewed and patient was observed for 5 minutes prior to treatment. Visit Vitals BP (!) 123/51 (BP 1 Location: Right upper arm, BP Patient Position: At rest;Sitting) Pulse 73 Temp 98.1 °F (36.7 °C) Resp 20 SpO2 93% Breastfeeding No  
 
Venipuncture performed to RAC x 1 successful attempt. Blood sample was collected without difficulty and sent to lab for (CBC w/diff),per written orders. Gauze dressing applied to site and pressure held x 2 minutes, then wrapped with coban. Preliminary Lab results were obtained and reviewed. H/H= 11.4/35.9 H/H outside of ordered parameters for the administration of Epoetin alpha today and injection will be held. Patient made aware and verbalized her understanding. Patient armband removed and shredded. Ms. Toya Neal was discharged from [place] [address] in stable condition at 1145. She is to return on 2021 at 1100 for her next appointment. Gauri Aquino RN February 15, 2021 
1:03 PM

## 2021-02-17 NOTE — PROGRESS NOTES
[facility] Progress Note Date: 2021 Name: Kerri Cummins MRN: [id number] : 1945 Peripheral Lab Draw Ms. Espinal to [facility], ambulatory at [phone] accompanied by self. Pt was assessed and education was provided. There were no vitals taken for this visit. Blood obtained peripherally from right arm x 1 attempt with butterfly needle and sent to lab for Cmp, Iron Profile and Ferritin per written orders. No bleeding or hematoma noted at site. Gauze and coban applied. Ms. Andressa Boykin tolerated the phlebotomy, and had no complaints. Patient armband removed and shredded. Ms. Andressa Boykin was discharged from [place] [address] in stable condition at 0930. Klarissa Ramsey Phlebotomist PCT 2021 
9:52 AM

## 2021-03-10 NOTE — PROGRESS NOTES
Sarah Sanchez is a 76 y.o. female who was seen by synchronous (real-time) audio- technology on 3/10/2021. Assessment & Plan:  
Diagnoses and all orders for this visit: 
 
1. Iron deficiency anemia, unspecified iron deficiency anemia type 2. Normocytic anemia 3. Stage 4 chronic kidney disease ([address] The complexity of medical decision making for this visit is moderate 1. Normocytic anemia/iron deficiency anemia Patient with normocytic anemia likely from anemia of chronic kidney disease. She had colonoscopy in [place] she said. She was told to have repeated in 5 years she said. I will however r/o myeloma as well as nutritional deficiency as below.  
  
Labs on 12/3/2019 showed a ferritin of 26, transferrin saturation 17%, BUN 47, creatinine 3.55, normal B12 and folate. IgG, IgA and IgM were normal.  K 95,L 55, K/L1.73. Total protein 5.9, albumin 3.4, SPEP showed no M spike and KELLI was normal.  Serum erythropoietin was 14.6. WBC 4.4, H&H 8.2/25, platelet 664. MCV 84. Labs on 7/06/20 showed TSH 0.79, normal B12 and folate, BUN 41, creatinine 4.28, transferrin saturation 16%, ferritin 70. On 6/20/2020, H&H 7.8/24.4, WBC 4.2, MCV 89, platelet 889. She is status post IV iron Injectafer x2 completed on 1/21/2020. Labs on 12/28/2020 showed BUN 43, creatinine 6.23. Transferrin saturation 61%, ferritin 36, WBC 3.4, H&H 7.4/25.0, MCV 94.7, platelet 415. Labs on 3/1/2021 showed WBC 4.1, H&H 10.8/34, platelet 787. MCV 88. Ferritin was 176 and transferrin saturation 17% in February 17, 2021. Serum erythropoietin was 15 in December 2020. Patient has serum erythropoietin was  less than 500. She will likely benefit from erythropoietin stimulating agent once his iron stores are replenished. I will defer to nephrology. Myeloma work-up is negative. The elevated light chain are due to her kidney disease. PCP to order cologuard or FIT Continue Ferrous sulfate every other day with vit C 
  
2. Stage 4 chronic kidney disease ([facility] Serum Epo level. If<500, she would qualify for erythropoietin agent (BOONE) replacement if iron store adequate. Follow-up with nephrologist for erythropoietin stimulating agent and management of her kidney disease. CC:  (nephrology) 
  
Return in 3 months I spent at least 15 minutes on this visit with this established patient. [address] Subjective:  
Carolyn Chris is a 76 y.o.  female with past medical history including hypertension, CKD 4, type 2 diabetes, hyperlipidemia and COPD, who I was asked to see in consultation at the request of Sandeep Lancaster and Dalia Corley MD, PA-C for evaluation for anemia. I saw her on 12/03/2019 and labs were ordered including myeloma work-up. Myeloma work-up was negative. Patient has borderline normal iron stores but still has severe anemia due to anemia of chronic kidney disease. She is clinically doing very well. She denies any melena or bright red blood per rectum. No focal neurologic deficit. All other point of review of system have been reviewed and were negative. ECOG performance status 1. Prior to Admission medications Medication Sig Start Date End Date Taking? Authorizing Provider TRADJENTA 5 mg tablet  12/9/19   Provider, Historical  
pantoprazole (PROTONIX) 40 mg tablet Take 40 mg by mouth daily. Provider, Historical  
cyanocobalamin (VITAMIN B-12) 1,000 mcg tablet Take 1,000 mcg by mouth daily. Provider, Historical  
folic acid (FOLVITE) 1 mg tablet Take  by mouth daily. Provider, Historical  
sodium bicarbonate 650 mg tablet Take 650 mg by mouth two (2) times a day. Provider, Historical  
pregabalin (LYRICA) 150 mg capsule Take 150 mg by mouth two (2) times a day. Provider, Historical  
ferrous sulfate 325 mg (65 mg iron) tablet Take  by mouth two (2) times a day. Provider, Historical  
budesonide-formoterol (SYMBICORT) 160-4.5 mcg/actuation HFAA Take 2 Puffs by inhalation two (2) times a day. Provider, Historical  
hydrALAZINE (APRESOLINE) 10 mg tablet Take 30 mg by mouth three (3) times daily. Provider, Historical  
pitavastatin calcium (LIVALO) 4 mg tab tablet Take  by mouth daily. Provider, Historical  
 
Patient Active Problem List  
Diagnosis Code  Normocytic anemia D64.9  Stage 4 chronic kidney disease ([facility] N18.4  Iron deficiency anemia, unspecified D50.9 Patient Active Problem List  
 Diagnosis Date Noted  Iron deficiency anemia, unspecified 01/15/2021  Normocytic anemia 12/03/2019  Stage 4 chronic kidney disease ([facility] 12/03/2019 Current Outpatient Medications Medication Sig Dispense Refill  TRADJENTA 5 mg tablet   0  
 pantoprazole (PROTONIX) 40 mg tablet Take 40 mg by mouth daily.  cyanocobalamin (VITAMIN B-12) 1,000 mcg tablet Take 1,000 mcg by mouth daily.  folic acid (FOLVITE) 1 mg tablet Take  by mouth daily.  sodium bicarbonate 650 mg tablet Take 650 mg by mouth two (2) times a day.  pregabalin (LYRICA) 150 mg capsule Take 150 mg by mouth two (2) times a day.  ferrous sulfate 325 mg (65 mg iron) tablet Take  by mouth two (2) times a day.  budesonide-formoterol (SYMBICORT) 160-4.5 mcg/actuation HFAA Take 2 Puffs by inhalation two (2) times a day.  hydrALAZINE (APRESOLINE) 10 mg tablet Take 30 mg by mouth three (3) times daily.  pitavastatin calcium (LIVALO) 4 mg tab tablet Take  by mouth daily. No Known Allergies Past Medical History:  
Diagnosis Date  Anemia  Chronic kidney disease (CKD)  COPD (chronic obstructive pulmonary disease) (HCC)  Diabetes ([facility]  GERD (gastroesophageal reflux disease)  HTN (hypertension)  Hyperlipidemia  Menopause Past Surgical History:  
Procedure Laterality Date [facility] CHEST SURGERY PROCEDURE UNLISTED Right 2020 Dialysis Catheter No family history on file. Social History Tobacco Use  Smoking status: Former Smoker Packs/day: 1.00 Years: 50.00 Pack years: 50.00 Types: Cigarettes Quit date: 2018 Years since quitting: 3.1  Smokeless tobacco: Never Used Substance Use Topics  Alcohol use: Not Currently ROS: as per HPi Objective: No flowsheet data found. Additional exam findings: We discussed the expected course, resolution and complications of the diagnosis(es) in detail. Medication risks, benefits, costs, interactions, and alternatives were discussed as indicated. I advised her to contact the office if her condition worsens, changes or fails to improve as anticipated. She expressed understanding with the diagnosis(es) and plan. Jeffry Reyes, who was evaluated through a patient-initiated, synchronous (real-time) audio- encounter, and/or her healthcare decision maker, is aware that it is a billable service, with coverage as determined by her insurance carrier. She provided verbal consent to proceed: Yes, and patient identification was verified. It was conducted pursuant to the emergency declaration under the [address] [place] [address] authority and the [employer] General Act. A caregiver was present when appropriate. Ability to conduct physical exam was limited. I was at home. The patient was at home.  
 
 
Amanda aGrcia MD

## 2021-04-08 NOTE — TELEPHONE ENCOUNTER
Received notification from [employer] that Erica Ray will need updated labs for her retacrit injection. Called and spoke with caregiver Suzanne Homans who advised me that she can take Ms. Kerline Granado to [address] [place] [address] Lab-building [facility] to have her labs done on 4/9. All labs ordered in the chart.

## 2021-04-12 NOTE — PROGRESS NOTES
[facility] Progress Note Date: 2021 Name: Shaye Tejada MRN: [id number] : 1945 Peripheral Lab Draw Ms. Espinal to [facility], ambulatory at [phone] accompanied by self. Pt was assessed and education was provided. Ms. Marlen Winter vitals were reviewed and patient was observed for 5 minutes prior to treatment. Visit Vitals BP (!) 138/59 (BP 1 Location: Right arm, BP Patient Position: Sitting) Pulse 75 Temp 98 °F (36.7 °C) SpO2 95% Blood obtained peripherally from left hand x 1 attempt with butterfly needle and sent to lab for Type and Crossmatch per written orders. No bleeding or hematoma noted at site. Gauze and coban applied. Ms. Mell Prater tolerated the phlebotomy, and had no complaints. Patient armband removed and shredded. Ms. Mell Prater was discharged from [place] [address] in stable condition at [phone]. Willow Joy Phlebotomist PCT 2021 
2:16 PM

## 2021-04-14 NOTE — PROGRESS NOTES
[facility] Progress Note Date: 2021 Name: Hannah Mejía MRN: [id number] : 1945 1unit PRBC Ms. Segun Almodovar arrived to [facility] at 0407. Ms. Segun Almodovar was assessed and education was provided. Discussed risks and benefits of blood transfusion with patient, including risk of transfusion reaction and disease transmission. Patient verbalized understanding and signed consent placed on chart. Ms. Brittany Reyes vitals were reviewed. Visit Vitals BP (!) 96/54 (BP 1 Location: Right upper arm, BP Patient Position: Sitting) Pulse 67 Temp 97.3 °F (36.3 °C) Resp 18 SpO2 97% Breastfeeding No  
 
 
24g IV inserted in patient's Right forearm right, condition patent and no redness x1 attempt by Kyra Feliciano RN. Positive for blood return and flushes without difficulty. Normal saline initiated at [facility]. Pre-medications (Tylenol 650mg PO and Benadryl 25mg PO) were administered as ordered. First unit of two ordered units of PRBCs initiated @ 75 ml/hr at 1030. Fifteen minutes into infusion, VS stable and pt denied c/o SOB, itching/hives, lip/tongue/facial swelling, CP or other complaints. Infusion rate increased to 100 ml/hr. Fifteen minutes later, VS stable and pt denied complaints; infusion rate increased to 150 ml/hr for the remainder of the transfusion. Unit finished @ [id number]. VS stable and no transfusion reaction suspected. Ms. Segun Almodovar tolerated infusion without complaints. IV removed. No irritation, bleeding, or hematoma noted at site. Gauze and coban applied to site. Patient remained in [facility] for 30 minutes for observation. Patient declined to stay to remaining 30 minutes post-infusion. No signs of allergic reaction noted. Discharge instructions reviewed with pt. Pt instructed to report SOB, CP, elevated temp, back pain, or other symptoms of transfusion reaction to MD or ED. Pt verbalized understanding. Patient armband removed and shredded Ms. Segun Almod[facility]ar [facility] condition at 1340. She is to follow up with referring provider. Padilla Sotelo RN April 14, 2021  
[id number]

## 2021-04-19 NOTE — PROGRESS NOTES
[facility] Progress Note    Date: 2021    Name: [place] Zarco    MRN: [id number]         : 1945    Epoetin alpha Injection(Retacrit)   POC Labs      Ms. Alec Rueda arrived to [facility], ambulatory, and in no acute distress,  at 12. Pt was assessed and education was provided. Patient denied complaints of pain or discomfort. Ms. Melanie Goncalves vitals were reviewed and patient was observed for 5 minutes prior to treatment. Visit Vitals  BP (!) 138/59 (BP 1 Location: Right upper arm, BP Patient Position: At rest;Sitting)   Pulse 78   Temp (!) 96.4 °F (35.8 °C)   Resp 18   SpO2 96%     Venipuncture performed to posterior right hand by Shoshana phlebotomist, after two unsuccessful attempts made by this nurse. Blood sample was collected without difficulty and sent to lab for (CBC w/diff),per written orders. Preliminary Lab results were obtained and reviewed. H/H= 7.8/25.9  H/H within ordered parameters for administering Retacrit injection today. Patient made aware and verbalized her understanding. Retacrit 40,000 units inj., administered SQ to posterior aspect of patient's right upper arm. Site without s/s of complication. Bandaid applied to injection site. Patient armband removed and shredded. Ms. Alec Rueda was discharged from [place] [address] in stable condition at 1140. She is to return on 2021 at 1100 for her next appointment.     Bhargav Singh RN  2021  1:03 PM

## 2021-05-03 NOTE — PROGRESS NOTES
[facility] Progress Note    Date: May 3, 2021    Name: Shaye Tejada    MRN: [id number]         : 1945    Epoetin alpha Injection(Retacrit)   POC Labs      Ms. Mell Prater arrived to [facility], ambulatory, and in no acute distress,  at 12. Pt was assessed and education was provided. Patient denied complaints of pain or discomfort. Ms. Marlen Winter vitals were reviewed and patient was observed for 5 minutes prior to treatment. Visit Vitals  BP (!) 122/54 (BP 1 Location: Right upper arm, BP Patient Position: At rest;Sitting)   Pulse 70   Temp 97.3 °F (36.3 °C)   Resp 22   SpO2 97%   Breastfeeding No     Venipuncture performed to posterior right hand after 1 unsuccessful attempt in RAC by this nurse and blood sample for CBC w/diff processed in [facility] lab. Preliminary Lab results were obtained and reviewed. H/H= 6.9/22.6  Critical lab value reported to Richelle Moura NP with verbal order received to obtain Type & Crossmatch and transfuse 2 units of PRBCs. Patient made aware of her lab values and new orders noted for blood transfusion. Additional blood samples for T&C obtained via venipuncture to metacarpal vein of posterior right hand, and sent to main lab for processing. Blood band placed on patient's RUE and patient instructed to keep it on and return to [facility] on Wednesday at 0800 for blood transfusion. Retacrit 40,000 units inj., administered SQ to posterior aspect of patient's right upper arm. Site without s/s of complication. Bandaid applied to injection site. Patient armband removed and shredded. Ms. Mell Prater was discharged from [place] [address] in stable condition at 1210. She is to return on 2021 at 0800 for her blood transfusion appointment.     Hoda Wright RN  May 3, 2021  1:03 PM

## 2021-05-05 NOTE — PROGRESS NOTES
[facility] Progress Note Date: May 5, 2021 Name: Tommy Hebert MRN: [id number] : 1945 PRBCs (2 units) Ms. Bindu Bell arrived ambulatory, and in no acute distress, to [facility]. Patient presents today for transfusion of 2 units of PRBCs, as ordered for H/H of 6.9/22.6 on 5/3/2021. Ms. Bindu Bell was assessed and education was provided. Provider discussed risks and benefits of blood transfusion with patient, including risk of transfusion reaction and disease transmission. Patient verbalized understanding and signed consent placed on chart. Ms. Goddard Heart vitals were reviewed. Visit Vitals BP (!) 119/51 (BP 1 Location: Right upper arm, BP Patient Position: At rest;Sitting) Pulse 69 Temp 97.6 °F (36.4 °C) Resp 18 SpO2 93% IV site established on first attempt with 22g PIV catheter inserted in basilic vein to posterior aspect of right forearm. Brisk blood return was noted and catheter flushed easily with 10 mL NS. Patient denied complaints. No evidence of complication noted. 250 mL bag of NS initiated via PIV line @ 25 mL/hr. Pre-medications (Tylenol 650 mg p.o  and Benadryl 25 mg p.o) were administered as ordered. 0930 - First unit of two ordered units of PRBCs initiated @ 75 mL/hr. VS checked after 15 minutes of infusion. VS stable and pt denied c/o SOB, itching/hives, lip/tongue/facial swelling, CP or other complaints. Infusion rate increased to 100 mL/hr. Fifteen minutes later, VS stable and pt denied complaints; infusion rate increased to 150 mL/hr for the remainder of the transfusion. Unit completed @ 1215. VS remained stable and no transfusion reaction suspected. PIV line flushed with NS. IV site without evidence of complication. Patient offered snack and drink and tolerated it well. Patient encouraged to get up to BR with assistance but declined.   
 
New 250 mL bag of NS initiated via new blood administration set and PIV line @ 75 mL/hr.  
 
1240 - Second unit of two ordered units of PRBCs initiated @ 75 mL/hr at  Fifteen minutes into infusion, VS stable and pt denied c/o SOB, itching/hives, lip/tongue/facial swelling, CP or other complaints. Infusion rate increased to 100 mL/hr. Fifteen minutes later, VS stable and pt denied complaints; infusion rate increased to 150 mL/hr for the remainder of the transfusion. Transfusion completed @ 1520. VS stable and no transfusion reaction suspected. IV site without evidence of complication. IV catheter flushed with NS. Patient up to bathroom to void. Patient remained in [facility] x 30 minutes post transfusion for observation. No adverse reaction was noted. Ms. Christine Munroe tolerated transfusion well and  without complaints. VS remained stable. IV site d/cd with catheter removed intact. No irritation, bleeding, or hematoma noted at site. Gauze and coban applied to site. Discharge instructions reviewed with pt. Pt instructed to report SOB, CP, elevated temp, back pain, or other symptoms of transfusion reaction to MD or ED. Pt verbalized understanding. Patient armband removed and shredded Ms. Christine Munroe was discharged from [place] [address] in stable condition at 1550. She is to follow up with her physician as needed. Abimael Schmidt RN May 5, 2021

## 2021-05-17 NOTE — PROGRESS NOTES
[address] [facility] Progress Note Date: May 17, 2021 Name: Alejandra Grant MRN: [id number] : 1945 POC Labs Epoetin alpha Injection(Retacrit) (HELD) Ms. Yanna Rose arrived to [facility], ambulatory, and in no acute distress at 12. Pt was assessed and education was provided. Patient denied complaints of pain or discomfort. Ms. Mauro Martínez vitals were reviewed and patient was observed for 5 minutes prior to treatment. Visit Vitals BP (!) 148/68 (BP 1 Location: Right upper arm, BP Patient Position: Sitting) Pulse 81 Temp 97.7 °F (36.5 °C) Resp 18 SpO2 97% Venipuncture performed to right hand x1 successful attempt. Blood sample was collected without difficulty and sent to lab for (CBC w/diff),per written orders. Gauze & bandaid applied to site. Preliminary Lab results were obtained and reviewed. H/H= 10.0/32.0 H/H outside of ordered parameters for the administration of Epoetin alpha today and injection will be held. No results found for this or any previous visit (from the past 12 hour(s)). Patient made aware and verbalized her understanding. Patient armband removed and shredded. Ms. Yanna Rose was discharged from [place] [address] in stable condition at 1125. She is to return on 2021 at 1300 for her next appointment. Jona Durham RN May 17, 2021

## 2021-06-07 NOTE — PROGRESS NOTES
[facility] Progress Note Date: 2021 Name: Krista Ashby MRN: [id number] : 1945 Retacrit Injection POC labs Ms. Yassine Bautista arrived to [facility] at [phone]. Ms. Yassine Bautista was assessed and education was provided. Ms. Lew Anne vitals were reviewed. Visit Vitals BP (!) 126/56 Pulse 77 Temp 98.8 °F (37.1 °C) Resp 18 SpO2 97% Breastfeeding No  
 
 
Blood drawn for labs via Right hand right, condition patent and no redness venipuncture x one attempt [place] Lab results were obtained and reviewed. Recent Results (from the past 12 hour(s)) CBC WITH 3 PART DIFF Collection Time: 21  1:36 PM  
Result Value Ref Range WBC 4.6 4.5 - 13.0 K/uL  
 RBC 3.47 (L) 4.10 - 5.10 M/uL HGB 9.7 (L) 12.0 - 16.0 g/dL HCT 30.4 (L) 36 - 48 % MCV 87.6 78 - 102 FL  
 MCH 28.0 25.0 - 35.0 PG  
 MCHC 31.9 31 - 37 g/dL  
 RDW 16.7 (H) 11.5 - 14.5 % NEUTROPHILS 61 40 - 70 % MIXED CELLS 19 (H) 0.1 - 17 % LYMPHOCYTES 20 14 - 44 % ABS. NEUTROPHILS 2.8 1.8 - 9.5 K/UL  
 ABS. MIXED CELLS 0.9 0.0 - 2.3 K/uL  
 ABS. LYMPHOCYTES 0.9 (L) 1.1 - 5.9 K/UL  
 DF AUTOMATED Hgb 9.7 and Hct 30.4. Retacrit 40,000 units  was administered as ordered SQ in patient's Right upper arm ( right) and band-aid applied to site. Ms. Yassine Bautista tolerated well without complaints. Discharge/ follow-up instructions discussed w/ pt. Pt verbalized understanding. Pt armband removed & shredded. Ms. Yassine Bautista was discharged from [place] [address] in stable condition at 1405. She is to return on 2021 at 1100 for her next appointment. Giuliano Mota RN 
2021

## 2021-06-16 NOTE — PROGRESS NOTES
Berta Rabago is a 76 y.o. female who was seen by synchronous (real-time) audio- technology on 6/16/2021. Assessment & Plan:   Diagnoses and all orders for this visit:    1. Normocytic anemia    2. Iron deficiency anemia, unspecified iron deficiency anemia type    3. Stage 4 chronic kidney disease ([address]        The complexity of medical decision making for this visit is moderate     1. Normocytic anemia/iron deficiency anemia  Patient with normocytic anemia likely from anemia of chronic kidney disease. She had colonoscopy in [place] she said. She was told to have repeated in 5 years she said. I will however r/o myeloma as well as nutritional deficiency as below.      Labs on 12/3/2019 showed a ferritin of 26, transferrin saturation 17%, BUN 47, creatinine 3.55, normal B12 and folate. IgG, IgA and IgM were normal.  K 95,L 55, K/L1.73. Total protein 5.9, albumin 3.4, SPEP showed no M spike and KELLI was normal.  Serum erythropoietin was 14.6. WBC 4.4, H&H 8.2/25, platelet 370. MCV 84. Labs on 7/06/20 showed TSH 0.79, normal B12 and folate, BUN 41, creatinine 4.28, transferrin saturation 16%, ferritin 70. On 6/20/2020, H&H 7.8/24.4, WBC 4.2, MCV 89, platelet 033. She is status post IV iron Injectafer x2 completed on 1/21/2020. --Labs on 12/28/2020 showed BUN 43, creatinine 6.23. Transferrin saturation 61%, ferritin 36, WBC 3.4, H&H 7.4/25.0, MCV 94.7, platelet 236. --Labs on 3/1/2021 showed WBC 4.1, H&H 10.8/34, platelet 933. MCV 88. Ferritin was 176 and transferrin saturation 17% in February 17, 2021. Serum erythropoietin was 15 in December 2020. --Labs on 6/7/2021 showed ferritin 298, transferrin saturation 19%, BUN 68, creatinine 6.58, serum EPO level 8.9, WBC 4.6, H&H 9.7/30.4, MCV 88, platelets 220. Myeloma work-up is negative. The elevated light chain are due to her kidney disease.   *PCP to order cologuard or FIT  *Continue Ferrous sulfate every other day with vit C     2. Stage 4 chronic kidney disease ([facility] [address]  Start Retacrit short 20,000 units subcu every 2 weeks for hemoglobin less than 10       Return in 6 weeks with CBC same day  I spent at least 15 minutes on this visit with this established patient. 712  Subjective:   Alejandra Grant is a 76 y.o.  female with past medical history including hypertension, CKD 4, type 2 diabetes, hyperlipidemia and COPD, who I was asked to see in consultation at the request of Kerri Favre and Laxmi Castillo MD, PA-C for evaluation for anemia. I saw her on 12/03/2019 and labs were ordered including myeloma work-up. Myeloma work-up was negative. Patient has borderline normal iron stores but still has severe anemia due to anemia of chronic kidney disease. She is clinically doing very well. She denies any melena or bright red blood per rectum. No focal neurologic deficit. All other point of review of system have been reviewed and were negative. ECOG performance status 1. Prior to Admission medications    Medication Sig Start Date End Date Taking? Authorizing Provider   TRADJENTA 5 mg tablet  12/9/19  Yes Provider, Historical   pantoprazole (PROTONIX) 40 mg tablet Take 40 mg by mouth daily. Yes Provider, Historical   cyanocobalamin (VITAMIN B-12) 1,000 mcg tablet Take 1,000 mcg by mouth daily. Yes Provider, Historical   folic acid (FOLVITE) 1 mg tablet Take  by mouth daily. Yes Provider, Historical   sodium bicarbonate 650 mg tablet Take 650 mg by mouth two (2) times a day. Yes Provider, Historical   pregabalin (LYRICA) 150 mg capsule Take 150 mg by mouth two (2) times a day. Yes Provider, Historical   ferrous sulfate 325 mg (65 mg iron) tablet Take  by mouth two (2) times a day. Yes Provider, Historical   budesonide-formoterol (SYMBICORT) 160-4.5 mcg/actuation HFAA Take 2 Puffs by inhalation two (2) times a day. Yes Provider, Historical   hydrALAZINE (APRESOLINE) 10 mg tablet Take 30 mg by mouth three (3) times daily.    Yes Provider, Historical pitavastatin calcium (LIVALO) 4 mg tab tablet Take  by mouth daily. Yes Provider, Historical     Patient Active Problem List   Diagnosis Code    Normocytic anemia D64.9    Stage 4 chronic kidney disease ([facility] [address] N18.4    Iron deficiency anemia, unspecified D50.9     Patient Active Problem List    Diagnosis Date Noted    Iron deficiency anemia, unspecified 01/15/2021    Normocytic anemia 12/03/2019    Stage 4 chronic kidney disease ([facility] 12/03/2019     Current Outpatient Medications   Medication Sig Dispense Refill    TRADJENTA 5 mg tablet   0    pantoprazole (PROTONIX) 40 mg tablet Take 40 mg by mouth daily.  cyanocobalamin (VITAMIN B-12) 1,000 mcg tablet Take 1,000 mcg by mouth daily.  folic acid (FOLVITE) 1 mg tablet Take  by mouth daily.  sodium bicarbonate 650 mg tablet Take 650 mg by mouth two (2) times a day.  pregabalin (LYRICA) 150 mg capsule Take 150 mg by mouth two (2) times a day.  ferrous sulfate 325 mg (65 mg iron) tablet Take  by mouth two (2) times a day.  budesonide-formoterol (SYMBICORT) 160-4.5 mcg/actuation HFAA Take 2 Puffs by inhalation two (2) times a day.  hydrALAZINE (APRESOLINE) 10 mg tablet Take 30 mg by mouth three (3) times daily.  pitavastatin calcium (LIVALO) 4 mg tab tablet Take  by mouth daily. No Known Allergies  Past Medical History:   Diagnosis Date    Anemia     Chronic kidney disease (CKD)     COPD (chronic obstructive pulmonary disease) (HCC)     Diabetes (HCC)     GERD (gastroesophageal reflux disease)     HTN (hypertension)     Hyperlipidemia     Menopause      Past Surgical History:   Procedure Laterality Date    HX HYSTERECTOMY  1980    [place] CHEST SURGERY PROCEDURE UNLISTED Right 2020    Dialysis Catheter     History reviewed. No pertinent family history.   Social History     Tobacco Use    Smoking status: Former Smoker     Packs/day: 1.00     Years: 50.00     Pack years: 50.00     Types: Cigarettes     Quit date: 2018     Years since quitting: 3.4    Smokeless tobacco: Never Used   Substance Use Topics    Alcohol use: Not Currently       ROS: as per HPi    Objective:     Patient-Reported Vitals 6/16/2021   Patient-Reported Weight 183lb   Patient-Reported Pulse 76   Patient-Reported Temperature n/a   Patient-Reported SpO2 n/a   Patient-Reported Systolic  87   Patient-Reported Diastolic 44   Patient-Reported Peak Flow n/a        Additional exam findings: We discussed the expected course, resolution and complications of the diagnosis(es) in detail. Medication risks, benefits, costs, interactions, and alternatives were discussed as indicated. I advised her to contact the office if her condition worsens, changes or fails to improve as anticipated. She expressed understanding with the diagnosis(es) and plan. Alejandra Grant, who was evaluated through a patient-initiated, synchronous (real-time) audio- encounter, and/or her healthcare decision maker, is aware that it is a billable service, with coverage as determined by her insurance carrier. She provided verbal consent to proceed: Yes, and patient identification was verified. It was conducted pursuant to the emergency declaration under the [facility], [address] [place] [address] authority and the [employer] and [employer] General Act. A caregiver was present when appropriate. Ability to conduct physical exam was limited. I was at home. The patient was at home.       Naa Fernandez MD

## 2021-06-16 NOTE — PROGRESS NOTES
Wicho Elizabeth presents today for Chief Complaint Patient presents with  Follow-up Virtual/telephone visit Depression Screening: 
3 most recent PHQ Screens 7/10/2020 Little interest or pleasure in doing things Not at all Feeling down, depressed, irritable, or hopeless Not at all Total Score PHQ 2 0 Learning Assessment: 
Learning Assessment 12/3/2019 PRIMARY LEARNER Patient PRIMARY LANGUAGE ENGLISH  
LEARNER PREFERENCE PRIMARY LISTENING  
ANSWERED BY patient RELATIONSHIP SELF Fall Risk Fall Risk Assessment, last 12 mths 7/10/2020 Able to walk? Yes Fall in past 12 months? No  
 
 
ADL No flowsheet data found. Travel Screening:  
 Travel Screening No screening recorded since 06/15/21 0000 Travel History   Travel since 05/16/21 No documented travel since 05/16/21 Health Maintenance reviewed and discussed and ordered per Provider. Health Maintenance Due Topic Date Due  
 Hepatitis C Screening  Never done  COVID-19 Vaccine (1) Never done  DTaP/Tdap/Td series (1 - Tdap) Never done  Shingrix Vaccine Age 50> (1 of 2) Never done  Medicare Yearly Exam  Never done  Pneumococcal 65+ yrs at Risk Vaccine (2 of 2 - PCV13) 10/21/2020 Víctor Faustin Coordination of Care: 1. Have you been to the ER, urgent care clinic since your last visit? Hospitalized since your last visit? no 
 
2. Have you seen or consulted any other health care providers outside of the [address] [place] [address] since your last visit? Include any pap smears or colon screening.  no

## 2021-06-21 NOTE — PROGRESS NOTES
[facility] Progress Note    Date: 2021    Name: Alejandra Grant    MRN: [id number]         : 1945     Retacrit Injection  POC labs      Ms. Yanna Rose arrived to [facility] at [id number]. Ms. Yanna Rose was assessed and education was provided. Ms. Mauro Martínez vitals were reviewed. Visit Vitals  BP (!) 140/61 (BP 1 Location: Right arm, BP Patient Position: Sitting)   Pulse 79   Temp 98.7 °F (37.1 °C)   Resp 20   SpO2 96%   Breastfeeding No       Blood drawn for labs via Right hand right, condition patent and no redness venipuncture x one attempt. Blood sample for CBC w/diff. Lab results were obtained and reviewed. Hgb 8.2 and Hct 26.5. Retacrit 40,000 units was administered as ordered SQ in patient's Left upper arm and band-aid applied. Ms. Yanna Rose tolerated well without complaints. Discharge/ follow-up instructions discussed w/ pt. Pt verbalized understanding. Pt armband removed & shredded. Ms. Yanna Rose was discharged from [place] [address] in stable condition at 1125. She is to return on 2021 at 1300 for her next appointment.     Stella Sandoval RN  2021

## 2021-07-12 NOTE — PROGRESS NOTES
[facility] Progress Note    Date: 2021    Name: Chandrika Warner    MRN: [id number]         : 1945    POC Labs  Epoetin alpha Injection(Retacrit)    Ms. Cortney Noonan arrived to [facility], ambulatory, and in no acute distress,  at 1320. Pt was assessed and education was provided. Patient denied complaints of pain or discomfort. Patient presents today for first dose of Epoetin alpha inj., dependent upon today's lab results, and as ordered. Procedure was explained to patient and she verbalized her understanding. Patient denied any questions or concerns regarding therapy plan. Ms. Cara Mendoza vitals were reviewed and patient was observed for 5 minutes prior to treatment. Visit Vitals  BP (!) 145/63 (BP 1 Location: Right lower arm, BP Patient Position: Sitting)   Pulse 79   Temp 97.6 °F (36.4 °C)   Resp 20   Wt 86.4 kg (190 lb 8 oz)   SpO2 95%   BMI 32.70 kg/m²     Venipuncture performed to right cephalic x1 successful attempt. Blood sample was collected without difficulty and sent to lab for (CBC w/diff),per written orders. Gauze dressing and coban applied to site. Preliminary Lab results were obtained and reviewed. H/H= 8.1/25.0    Retacrit 20,000units was administered as ordered SQ in right upper arm. Band-aid applied to site. Patient made aware and verbalized her understanding. Patient armband removed and shredded. Ms. Cortney Noonan was discharged from [place] [address] in stable condition at 1350. She is to return on 2021 at 1300 for her next appointment.     Milli Arechiga RN  2021  1350

## 2021-07-26 NOTE — PROGRESS NOTES
[facility] Progress Note    Date: 2021    Name: Abimbola Glassr    MRN: [id number]         : 1945    POC Labs  Epoetin alpha Injection(Retacrit)     Ms. Angella Conklin arrived to [facility], ambulatory, and in no acute distress,  at 1310. Pt was assessed and education was provided. Patient denied complaints of pain or discomfort, recent change to her medication regimen or health condition. Ms. Tyron Ferguson vitals were reviewed and patient was observed for 5 minutes prior to treatment. Visit Vitals  /61 (BP 1 Location: Right upper arm, BP Patient Position: At rest;Sitting)   Pulse 77   Temp 97.6 °F (36.4 °C)   Resp 22   SpO2 94%     Venipuncture performed to right antecubital x1 successful attempt. Blood sample was collected without difficulty and sent to lab for (CBC w/diff),per written orders. Gauze dressing and coban applied to site. Preliminary Lab results were obtained and noted as follows:    H/H=8.6/27.7    Epoetin alpha (RETACRIT) 20,000 units inj., administered SQ to posterior aspect of right upper arm, without difficulty. Site without evidence of bleeding or swelling. Bandaid applied to site. Patient armband removed and shredded. Ms. Angella Conklin was discharged from [place] [address] in stable condition at 1150. She is to return on 2021 at 1300 for her next appointment.     Renata Ramos RN  2021  1150

## 2021-07-28 NOTE — PROGRESS NOTES
HEMATOLOGY/MEDICAL ONCOLOGY PROGRESS NOTE    NAME: Sandra Navarro  : 1945  DATE: 21    PCP: Bree Jonas MD    Ms. Sandra Navarro is a 68 y.o.  female with past medical history including hypertension, CKD 4, type 2 diabetes, hyperlipidemia and COPD, who was seen for management of her anemia. Myeloma work-up was negative. Patient has borderline normal iron stores but still has severe anemia due to anemia of chronic kidney disease. She reports she is on dialysis 3x a week on , , and  and she is also getting Procrit there. She is clinically doing very well. She denies fevers, chills, and recurrent infections. She denies fatigue, shortness of breath, and dizziness. She denies any melena or bright red blood per rectum. No focal neurologic deficit. She denies pain or discomfort. All other point of review of system have been reviewed and were negative. ECOG performance status 1. DX:Anemia    Past medical history, family history, and social history: these were reviewed and remain unchanged.       Past Medical History:   Diagnosis Date    Anemia     Chronic kidney disease (CKD)     COPD (chronic obstructive pulmonary disease) (HCC)     Diabetes (HCC)     GERD (gastroesophageal reflux disease)     HTN (hypertension)     Hyperlipidemia     Menopause      Past Surgical History:   Procedure Laterality Date    HX HYSTERECTOMY      [facility] CHEST SURGERY PROCEDURE UNLISTED Right 2020    Dialysis Catheter     Social History     Socioeconomic History    Marital status: SINGLE     Spouse name: Not on file    Number of children: Not on file    Years of education: Not on file    Highest education level: Not on file   Tobacco Use    Smoking status: Former Smoker     Packs/day: 1.00     Years: 50.00     Pack years: 50.00     Types: Cigarettes     Quit date:      Years since quitting: 3.5    Smokeless tobacco: Never Used   Substance and Sexual Activity  Alcohol use: Not Currently    Drug use: Never    Sexual activity: Not Currently     Social Determinants of Health     Financial Resource Strain:     Difficulty of Paying Living Expenses:    Food Insecurity:     Worried About Running Out of Food in the Last Year:     [address] in the Last Year:    Transportation Needs:     Lack of Transportation (Medical):  Lack of Transportation (Non-Medical):    Physical Activity:     Days of Exercise per Week:     Minutes of Exercise per Session:    Stress:     Feeling of Stress :    Social Connections:     Frequency of Communication with Friends and Family:     Frequency of Social Gatherings with Friends and Family:     Attends Faith Services:     Active Member of Clubs or Organizations:     Attends Club or Organization Meetings:     Marital Status:        No Known Allergies    Review of Systems  Denies any fevers, chills, shortness of breath, nausea vomiting abdominal pain. No weight loss. No focal neurologic deficit. Only positive in ROS is dyspnea on exertion occasionally. No melena or BRBPR. All other point of review of system have been reviewed and were negative. ECOG performance status 1. Independent with ADLs and IADLs. Objective:  Physical Exam:  There were no vitals taken for this visit. General:  Alert, cooperative, no distress, appears stated age. Head:  Normocephalic, without obvious abnormality, atraumatic. Eyes:  Conjunctivae/corneas clear. PERRL, EOMs intact. Throat: Lips, mucosa, and tongue normal.    Neck: Supple, symmetrical, trachea midline, no adenopathy, thyroid: no enlargement/tenderness/nodules   Back:   Symmetric, no curvature. ROM normal. No CVA tenderness. Lungs:   Clear to auscultation bilaterally. Chest wall:  No tenderness or deformity. Heart:  Regular rate and rhythm, S1, S2 normal, +2/6 JARED   Abdomen:   Soft, non-tender. Bowel sounds normal. No masses,  No organomegaly.    Extremities: Extremities normal, atraumatic, no cyanosis or edema. Skin: Skin color, texture, turgor normal. No rashes or lesions. Lymph nodes: Cervical, supraclavicular, and axillary nodes normal.   Neurologic: CNII-XII intact. Diagnostic Imaging     Results for orders placed during the hospital encounter of 01/08/21    CT LOW DOSE LUNG CANCER SCREENING    Narrative  LOW DOSE CT OF THE CHEST WITHOUT CONTRAST    INDICATION:  History of smoking. COMPARISON: 12/11/2019    TECHNIQUE: CT of the chest was performed without intravenous contrast with  multiplanar reformatted images. DICOM format image data is available to non-affiliated external healthcare  facilities or entities on a secure, media free, reciprocally searchable basis  with patient authorization for 12 months following the date of the study. FINDINGS:    The tracheobronchial tree is patent centrally. There is no focal infiltrate, pleural effusion, or pneumothorax. Stable areas of  scattered atelectasis visualized within all lobes. . No new noncalcified solid  nodules are appreciated. The heart is not enlarged. There is no pericardial effusion. There is no evidence of aortic aneurysm. Calcifications of the aorta and  coronary arteries. There is no significant mediastinal lymphadenopathy. The imaged portions of the upper abdomen demonstrate no acute findings. The osseous structures are acutely intact. Impression  IMPRESSION:    1. No new noncalcified lung nodules. 2.  Vascular calcifications.     Lung-RADS Category 1  Recommendation: Yearly screening      Lab Results  Lab Results   Component Value Date/Time    WBC 3.4 (L) 07/26/2021 01:25 PM    HGB 8.5 (L) 07/26/2021 01:25 PM    HCT 26.1 (L) 07/26/2021 01:25 PM    PLATELET 432 51/11/1511 01:25 PM    MCV 85.3 07/26/2021 01:25 PM       Lab Results   Component Value Date/Time    Sodium 138 06/07/2021 01:30 PM    Potassium 3.9 06/07/2021 01:30 PM    Chloride 103 06/07/2021 01:30 PM CO2 25 06/07/2021 01:30 PM    Anion gap 10 06/07/2021 01:30 PM    Glucose 148 (H) 06/07/2021 01:30 PM    BUN 68 (H) 06/07/2021 01:30 PM    Creatinine 6.58 (H) 06/07/2021 01:30 PM    BUN/Creatinine ratio 10 (L) 06/07/2021 01:30 PM    GFR est AA 7 (L) 06/07/2021 01:30 PM    GFR est non-AA 6 (L) 06/07/2021 01:30 PM    Calcium 8.0 (L) 06/07/2021 01:30 PM    Alk. phosphatase 82 06/07/2021 01:30 PM    Protein, total 7.4 06/07/2021 01:30 PM    Albumin 3.4 06/07/2021 01:30 PM    Globulin 4.0 06/07/2021 01:30 PM    A-G Ratio 0.9 06/07/2021 01:30 PM    ALT (SGPT) 13 06/07/2021 01:30 PM     Follow-up with PCP for health maintenance    Assessment/Plan:    1. Normocytic anemia/iron deficiency anemia  --12/2019: Myeloma work-up was negative. The elevated light chain are due to her kidney disease. --Last Retacrit dose was given on 07/26/21- Will discontinue Retacrit today as patient started getting Procrit at the Dialysis Center. --Labs on 07/26/2021 showed WBC 3.4, H&H 8.5/26.1, platelet 505  MCV 94.7. Most recent Ferritin on 06/07/21 was 298 and transferrin saturation 19%. Serum EPO level 8.9. BUN 68 and creatinine was 6.58  --PCP to order cologuard or FIT- patient will call her PCP today.     2. Stage 4 chronic kidney disease ([address]  --She states goes to dialysis 3x a week on Tuesdays, Thursdays, and Saturdays  --Procrit is being given at the dialysis center. --Will discontinue Retacrit today.     RTC 8 weeks-F/U with carmita Loo MD  07/28/21

## 2021-09-01 NOTE — PROGRESS NOTES
HEMATOLOGY/MEDICAL ONCOLOGY PROGRESS NOTE    NAME: Berlin Scruggs  : 1945  DATE: 21    PCP: Anabel Vines MD    Ms. Berlin Scruggs is a 68 y.o.  female with past medical history including hypertension, CKD 4, type 2 diabetes, hyperlipidemia and COPD, who was seen for management of her anemia. Myeloma work-up was negative. Patient has borderline normal iron stores but still has severe anemia due to anemia of chronic kidney disease. She reports she is on dialysis 3x a week on , , and  and she is also getting Procrit there. She is clinically doing very well. She denies fevers, chills, and recurrent infections. She denies fatigue, shortness of breath, and dizziness. She denies any melena or bright red blood per rectum. No focal neurologic deficit. She denies pain or discomfort. Reports some fatigue. All other point of review of system have been reviewed and were negative. ECOG performance status 1. DX:Anemia    Past medical history, family history, and social history: these were reviewed and remain unchanged.       Past Medical History:   Diagnosis Date    Anemia     Chronic kidney disease (CKD)     COPD (chronic obstructive pulmonary disease) (HCC)     Diabetes (HCC)     GERD (gastroesophageal reflux disease)     HTN (hypertension)     Hyperlipidemia     Menopause      Past Surgical History:   Procedure Laterality Date    HX HYSTERECTOMY      [facility] CHEST SURGERY PROCEDURE UNLISTED Right 2020    Dialysis Catheter     Social History     Socioeconomic History    Marital status: SINGLE     Spouse name: Not on file    Number of children: Not on file    Years of education: Not on file    Highest education level: Not on file   Tobacco Use    Smoking status: Former Smoker     Packs/day: 1.00     Years: 50.00     Pack years: 50.00     Types: Cigarettes     Quit date: 2018     Years since quitting: 3.6    Smokeless tobacco: Never Used Substance and Sexual Activity    Alcohol use: Not Currently    Drug use: Never    Sexual activity: Not Currently     Social Determinants of Health     Financial Resource Strain:     Difficulty of Paying Living Expenses:    Food Insecurity:     Worried About Running Out of Food in the Last Year:     [address] in the Last Year:    Transportation Needs:     Lack of Transportation (Medical):  Lack of Transportation (Non-Medical):    Physical Activity:     Days of Exercise per Week:     Minutes of Exercise per Session:    Stress:     Feeling of Stress :    Social Connections:     Frequency of Communication with Friends and Family:     Frequency of Social Gatherings with Friends and Family:     Attends Judaism Services:     Active Member of Clubs or Organizations:     Attends Club or Organization Meetings:     Marital Status:        No Known Allergies    Review of Systems  Denies any fevers, chills, shortness of breath, nausea vomiting abdominal pain. No weight loss. No focal neurologic deficit. Only positive in ROS is dyspnea on exertion occasionally. No melena or BRBPR. All other point of review of system have been reviewed and were negative. ECOG performance status 1. Independent with ADLs and IADLs. Objective:  Physical Exam:  Visit Vitals  BP (!) 83/46   Pulse 74   Temp 98.4 °F (36.9 °C)   SpO2 93%         General:  Alert, cooperative, no distress, appears stated age. Head:  Normocephalic, without obvious abnormality, atraumatic. Eyes:  Conjunctivae/corneas clear. PERRL, EOMs intact. Throat: Lips, mucosa, and tongue normal.    Neck: Supple, symmetrical, trachea midline, no adenopathy, thyroid: no enlargement/tenderness/nodules   Back:   Symmetric, no curvature. ROM normal. No CVA tenderness. Lungs:   Clear to auscultation bilaterally. Chest wall:  No tenderness or deformity. Heart:  Regular rate and rhythm, S1, S2 normal, +2/6 JARED   Abdomen:   Soft, non-tender.  Bowel sounds normal. No masses,  No organomegaly. Extremities: Extremities normal, atraumatic, no cyanosis or edema. Skin: Skin color, texture, turgor normal. No rashes or lesions. Lymph nodes: Cervical, supraclavicular, and axillary nodes normal.   Neurologic: CNII-XII intact. Diagnostic Imaging     Results for orders placed during the hospital encounter of 01/08/21    CT LOW DOSE LUNG CANCER SCREENING    Narrative  LOW DOSE CT OF THE CHEST WITHOUT CONTRAST    INDICATION:  History of smoking. COMPARISON: 12/11/2019    TECHNIQUE: CT of the chest was performed without intravenous contrast with  multiplanar reformatted images. DICOM format image data is available to non-affiliated external healthcare  facilities or entities on a secure, media free, reciprocally searchable basis  with patient authorization for 12 months following the date of the study. FINDINGS:    The tracheobronchial tree is patent centrally. There is no focal infiltrate, pleural effusion, or pneumothorax. Stable areas of  scattered atelectasis visualized within all lobes. . No new noncalcified solid  nodules are appreciated. The heart is not enlarged. There is no pericardial effusion. There is no evidence of aortic aneurysm. Calcifications of the aorta and  coronary arteries. There is no significant mediastinal lymphadenopathy. The imaged portions of the upper abdomen demonstrate no acute findings. The osseous structures are acutely intact. Impression  IMPRESSION:    1. No new noncalcified lung nodules. 2.  Vascular calcifications.     Lung-RADS Category 1  Recommendation: Yearly screening      Lab Results  Lab Results   Component Value Date/Time    WBC 3.5 (L) 09/01/2021 08:53 AM    HGB 9.4 (L) 09/01/2021 08:53 AM    HCT 29.2 (L) 09/01/2021 08:53 AM    PLATELET 838 94/37/6959 08:53 AM    MCV 86.1 09/01/2021 08:53 AM       Lab Results   Component Value Date/Time    Sodium 138 06/07/2021 01:30 PM    Potassium 3.9 06/07/2021 01:30 PM    Chloride 103 06/07/2021 01:30 PM    CO2 25 06/07/2021 01:30 PM    Anion gap 10 06/07/2021 01:30 PM    Glucose 148 (H) 06/07/2021 01:30 PM    BUN 68 (H) 06/07/2021 01:30 PM    Creatinine 6.58 (H) 06/07/2021 01:30 PM    BUN/Creatinine ratio 10 (L) 06/07/2021 01:30 PM    GFR est AA 7 (L) 06/07/2021 01:30 PM    GFR est non-AA 6 (L) 06/07/2021 01:30 PM    Calcium 8.0 (L) 06/07/2021 01:30 PM    Alk. phosphatase 82 06/07/2021 01:30 PM    Protein, total 7.4 06/07/2021 01:30 PM    Albumin 3.4 06/07/2021 01:30 PM    Globulin 4.0 06/07/2021 01:30 PM    A-G Ratio 0.9 06/07/2021 01:30 PM    ALT (SGPT) 13 06/07/2021 01:30 PM     Follow-up with PCP for health maintenance    Assessment/Plan:    1. Normocytic anemia/iron deficiency anemia  --12/2019: Myeloma work-up was negative. The elevated light chain are due to her kidney disease. --Last Retacrit dose was given on 07/26/21- Will discontinue Retacrit today as patient started getting Procrit at the Dialysis [facility]. --Labs on 07/26/2021 showed WBC 3.4, H&H 8.5/26.1, platelet 477  MCV 75.7. Most recent Ferritin on 06/07/21 was 298 and transferrin saturation 19%. Serum EPO level 8.9. BUN 68 and creatinine was 6.58.  --Labs on 9/1/2021 showed WBC 3.5, H&H 9.4/29, platelet 602. ALC 0.7. ANC normal at 2.2.  --Follow-up with nephrology for Procrit injection. --CBC stable. --PCP to order cologuard or FIT- patient will call her PCP today.     2. Stage 4 chronic kidney disease ([address]  --She states goes to dialysis 3x a week on Tuesdays, Thursdays, and Saturdays  --Procrit is being given at the dialysis center. --Procrit as per nephro    RTC 6 weeks with physician who will be covering this clinic since I will be leaving on October 4, 2021. It was a pleasure taking care of Mrs. Espinal-F/U with nephniles Rivera MD  09/01/21